# Patient Record
Sex: FEMALE | Race: BLACK OR AFRICAN AMERICAN | NOT HISPANIC OR LATINO | Employment: UNEMPLOYED | URBAN - METROPOLITAN AREA
[De-identification: names, ages, dates, MRNs, and addresses within clinical notes are randomized per-mention and may not be internally consistent; named-entity substitution may affect disease eponyms.]

---

## 2021-01-14 ENCOUNTER — HOSPITAL ENCOUNTER (INPATIENT)
Facility: HOSPITAL | Age: 51
LOS: 6 days | Discharge: HOME | DRG: 177 | End: 2021-01-20
Attending: EMERGENCY MEDICINE | Admitting: STUDENT IN AN ORGANIZED HEALTH CARE EDUCATION/TRAINING PROGRAM
Payer: COMMERCIAL

## 2021-01-14 ENCOUNTER — APPOINTMENT (EMERGENCY)
Dept: RADIOLOGY | Facility: HOSPITAL | Age: 51
DRG: 177 | End: 2021-01-14
Attending: EMERGENCY MEDICINE
Payer: COMMERCIAL

## 2021-01-14 DIAGNOSIS — U07.1 COVID-19: Primary | ICD-10-CM

## 2021-01-14 DIAGNOSIS — E87.6 HYPOKALEMIA: ICD-10-CM

## 2021-01-14 DIAGNOSIS — R09.02 HYPOXIA: ICD-10-CM

## 2021-01-14 DIAGNOSIS — R06.02 SHORTNESS OF BREATH: ICD-10-CM

## 2021-01-14 PROBLEM — J96.91 RESPIRATORY FAILURE WITH HYPOXIA (CMS/HCC): Status: ACTIVE | Noted: 2021-01-14

## 2021-01-14 LAB
ALBUMIN SERPL-MCNC: 3.8 G/DL (ref 3.4–5)
ALP SERPL-CCNC: 38 IU/L (ref 35–126)
ALT SERPL-CCNC: 41 IU/L (ref 11–54)
ANION GAP SERPL CALC-SCNC: 12 MEQ/L (ref 3–15)
APTT PPP: 27 SEC (ref 23–35)
AST SERPL-CCNC: 53 IU/L (ref 15–41)
BASOPHILS # BLD: 0 K/UL (ref 0.01–0.1)
BASOPHILS NFR BLD: 0 %
BILIRUB SERPL-MCNC: 1 MG/DL (ref 0.3–1.2)
BUN SERPL-MCNC: 13 MG/DL (ref 8–20)
CALCIUM SERPL-MCNC: 8.6 MG/DL (ref 8.9–10.3)
CHLORIDE SERPL-SCNC: 101 MEQ/L (ref 98–109)
CK SERPL-CCNC: 102 U/L (ref 15–200)
CO2 SERPL-SCNC: 29 MEQ/L (ref 22–32)
CREAT SERPL-MCNC: 0.9 MG/DL (ref 0.6–1.1)
CRP SERPL-MCNC: 97.3 MG/L
D DIMER PPP IA.FEU-MCNC: 1.62 UG/ML FEU (ref 0–0.5)
DACRYOCYTES BLD QL SMEAR: ABNORMAL
DIFFERENTIAL METHOD BLD: ABNORMAL
EOSINOPHIL # BLD: 0 K/UL (ref 0.04–0.36)
EOSINOPHIL NFR BLD: 0 %
ERYTHROCYTE [DISTWIDTH] IN BLOOD BY AUTOMATED COUNT: 12.5 % (ref 11.7–14.4)
FERRITIN SERPL-MCNC: 466 NG/ML (ref 11–250)
GFR SERPL CREATININE-BSD FRML MDRD: >60 ML/MIN/1.73M*2
GLUCOSE SERPL-MCNC: 110 MG/DL (ref 70–99)
HCT VFR BLDCO AUTO: 39.5 % (ref 35–45)
HGB BLD-MCNC: 13.1 G/DL (ref 11.8–15.7)
INR PPP: 0.9 INR
LDH SERPL L TO P-CCNC: 341 IU/L (ref 98–192)
LYMPHOCYTES # BLD: 0.86 K/UL (ref 1.2–3.5)
LYMPHOCYTES NFR BLD: 17 %
MAGNESIUM SERPL-MCNC: 2.5 MG/DL (ref 1.8–2.5)
MCH RBC QN AUTO: 30 PG (ref 28–33.2)
MCHC RBC AUTO-ENTMCNC: 33.2 G/DL (ref 32.2–35.5)
MCV RBC AUTO: 90.6 FL (ref 83–98)
MONOCYTES # BLD: 0.15 K/UL (ref 0.28–0.8)
MONOCYTES NFR BLD: 3 %
NEUTS BAND # BLD: 0.1 K/UL (ref 0–0.53)
NEUTS BAND # BLD: 3.86 K/UL (ref 1.7–7)
NEUTS BAND NFR BLD: 2 %
NEUTS SEG NFR BLD: 76 %
OVALOCYTES BLD QL SMEAR: ABNORMAL
PDW BLD AUTO: 8.7 FL (ref 9.4–12.3)
PHOSPHATE SERPL-MCNC: 2.7 MG/DL (ref 2.4–4.7)
PLAT MORPH BLD: NORMAL
PLATELET # BLD AUTO: 281 K/UL (ref 150–369)
PLATELET # BLD EST: ABNORMAL 10*3/UL
POTASSIUM SERPL-SCNC: 3.2 MEQ/L (ref 3.6–5.1)
PROT SERPL-MCNC: 7.2 G/DL (ref 6–8.2)
PROTHROMBIN TIME: 12.3 SEC (ref 12.2–14.5)
RBC # BLD AUTO: 4.36 M/UL (ref 3.93–5.22)
SODIUM SERPL-SCNC: 142 MEQ/L (ref 136–144)
TROPONIN I SERPL-MCNC: <0.02 NG/ML
TROPONIN I SERPL-MCNC: <0.02 NG/ML
VARIANT LYMPHS # BLD MANUAL: 0.1 K/UL
VARIANT LYMPHS NFR BLD: 2 %
WBC # BLD AUTO: 5.08 K/UL (ref 3.8–10.5)

## 2021-01-14 PROCEDURE — 96374 THER/PROPH/DIAG INJ IV PUSH: CPT

## 2021-01-14 PROCEDURE — 63600000 HC DRUGS/DETAIL CODE: Performed by: STUDENT IN AN ORGANIZED HEALTH CARE EDUCATION/TRAINING PROGRAM

## 2021-01-14 PROCEDURE — 20600000 HC ROOM AND CARE INTERMEDIATE/TELEMETRY

## 2021-01-14 PROCEDURE — 85610 PROTHROMBIN TIME: CPT | Performed by: STUDENT IN AN ORGANIZED HEALTH CARE EDUCATION/TRAINING PROGRAM

## 2021-01-14 PROCEDURE — 71045 X-RAY EXAM CHEST 1 VIEW: CPT

## 2021-01-14 PROCEDURE — 80053 COMPREHEN METABOLIC PANEL: CPT | Performed by: PHYSICIAN ASSISTANT

## 2021-01-14 PROCEDURE — 82728 ASSAY OF FERRITIN: CPT | Performed by: STUDENT IN AN ORGANIZED HEALTH CARE EDUCATION/TRAINING PROGRAM

## 2021-01-14 PROCEDURE — 84100 ASSAY OF PHOSPHORUS: CPT | Performed by: STUDENT IN AN ORGANIZED HEALTH CARE EDUCATION/TRAINING PROGRAM

## 2021-01-14 PROCEDURE — 99285 EMERGENCY DEPT VISIT HI MDM: CPT | Mod: 25

## 2021-01-14 PROCEDURE — 82550 ASSAY OF CK (CPK): CPT | Performed by: STUDENT IN AN ORGANIZED HEALTH CARE EDUCATION/TRAINING PROGRAM

## 2021-01-14 PROCEDURE — 93005 ELECTROCARDIOGRAM TRACING: CPT | Performed by: PHYSICIAN ASSISTANT

## 2021-01-14 PROCEDURE — 99223 1ST HOSP IP/OBS HIGH 75: CPT | Performed by: STUDENT IN AN ORGANIZED HEALTH CARE EDUCATION/TRAINING PROGRAM

## 2021-01-14 PROCEDURE — 63600000 HC DRUGS/DETAIL CODE: Mod: JW | Performed by: PHYSICIAN ASSISTANT

## 2021-01-14 PROCEDURE — 63700000 HC SELF-ADMINISTRABLE DRUG: Performed by: PHYSICIAN ASSISTANT

## 2021-01-14 PROCEDURE — 86140 C-REACTIVE PROTEIN: CPT | Performed by: STUDENT IN AN ORGANIZED HEALTH CARE EDUCATION/TRAINING PROGRAM

## 2021-01-14 PROCEDURE — 84484 ASSAY OF TROPONIN QUANT: CPT | Performed by: STUDENT IN AN ORGANIZED HEALTH CARE EDUCATION/TRAINING PROGRAM

## 2021-01-14 PROCEDURE — 25800000 HC PHARMACY IV SOLUTIONS: Performed by: PHYSICIAN ASSISTANT

## 2021-01-14 PROCEDURE — 83735 ASSAY OF MAGNESIUM: CPT | Performed by: STUDENT IN AN ORGANIZED HEALTH CARE EDUCATION/TRAINING PROGRAM

## 2021-01-14 PROCEDURE — 85379 FIBRIN DEGRADATION QUANT: CPT | Performed by: STUDENT IN AN ORGANIZED HEALTH CARE EDUCATION/TRAINING PROGRAM

## 2021-01-14 PROCEDURE — 85025 COMPLETE CBC W/AUTO DIFF WBC: CPT | Performed by: PHYSICIAN ASSISTANT

## 2021-01-14 PROCEDURE — 84484 ASSAY OF TROPONIN QUANT: CPT | Performed by: PHYSICIAN ASSISTANT

## 2021-01-14 PROCEDURE — 83615 LACTATE (LD) (LDH) ENZYME: CPT | Performed by: STUDENT IN AN ORGANIZED HEALTH CARE EDUCATION/TRAINING PROGRAM

## 2021-01-14 PROCEDURE — 85730 THROMBOPLASTIN TIME PARTIAL: CPT | Performed by: STUDENT IN AN ORGANIZED HEALTH CARE EDUCATION/TRAINING PROGRAM

## 2021-01-14 PROCEDURE — 36415 COLL VENOUS BLD VENIPUNCTURE: CPT | Performed by: EMERGENCY MEDICINE

## 2021-01-14 PROCEDURE — 63700000 HC SELF-ADMINISTRABLE DRUG: Performed by: STUDENT IN AN ORGANIZED HEALTH CARE EDUCATION/TRAINING PROGRAM

## 2021-01-14 RX ORDER — POTASSIUM CHLORIDE 14.9 MG/ML
20 INJECTION INTRAVENOUS AS NEEDED
Status: DISCONTINUED | OUTPATIENT
Start: 2021-01-14 | End: 2021-01-20 | Stop reason: HOSPADM

## 2021-01-14 RX ORDER — DEXAMETHASONE 6 MG/1
6 TABLET ORAL DAILY
Status: DISCONTINUED | OUTPATIENT
Start: 2021-01-14 | End: 2021-01-14

## 2021-01-14 RX ORDER — DOCUSATE SODIUM 100 MG/1
100 CAPSULE, LIQUID FILLED ORAL 2 TIMES DAILY PRN
Status: DISCONTINUED | OUTPATIENT
Start: 2021-01-14 | End: 2021-01-16

## 2021-01-14 RX ORDER — POTASSIUM CHLORIDE 750 MG/1
40 TABLET, FILM COATED, EXTENDED RELEASE ORAL ONCE
Status: COMPLETED | OUTPATIENT
Start: 2021-01-14 | End: 2021-01-14

## 2021-01-14 RX ORDER — POTASSIUM CHLORIDE 750 MG/1
20 TABLET, FILM COATED, EXTENDED RELEASE ORAL AS NEEDED
Status: DISCONTINUED | OUTPATIENT
Start: 2021-01-14 | End: 2021-01-20 | Stop reason: HOSPADM

## 2021-01-14 RX ORDER — GUAIFENESIN 100 MG/5ML
200 SOLUTION ORAL EVERY 4 HOURS PRN
Status: DISCONTINUED | OUTPATIENT
Start: 2021-01-14 | End: 2021-01-20

## 2021-01-14 RX ORDER — FLUTICASONE PROPIONATE 50 MCG
1 SPRAY, SUSPENSION (ML) NASAL DAILY PRN
COMMUNITY
End: 2025-02-21

## 2021-01-14 RX ORDER — DEXAMETHASONE SODIUM PHOSPHATE 4 MG/ML
6 INJECTION, SOLUTION INTRA-ARTICULAR; INTRALESIONAL; INTRAMUSCULAR; INTRAVENOUS; SOFT TISSUE ONCE
Status: COMPLETED | OUTPATIENT
Start: 2021-01-14 | End: 2021-01-14

## 2021-01-14 RX ORDER — DEXAMETHASONE 6 MG/1
6 TABLET ORAL DAILY
Status: COMPLETED | OUTPATIENT
Start: 2021-01-15 | End: 2021-01-19

## 2021-01-14 RX ORDER — ENOXAPARIN SODIUM 100 MG/ML
40 INJECTION SUBCUTANEOUS
Status: DISCONTINUED | OUTPATIENT
Start: 2021-01-14 | End: 2021-01-20 | Stop reason: HOSPADM

## 2021-01-14 RX ORDER — DEXTROSE 50 % IN WATER (D50W) INTRAVENOUS SYRINGE
25 AS NEEDED
Status: DISCONTINUED | OUTPATIENT
Start: 2021-01-14 | End: 2021-01-20 | Stop reason: HOSPADM

## 2021-01-14 RX ORDER — DEXTROSE 40 %
15-30 GEL (GRAM) ORAL AS NEEDED
Status: DISCONTINUED | OUTPATIENT
Start: 2021-01-14 | End: 2021-01-20 | Stop reason: HOSPADM

## 2021-01-14 RX ORDER — POTASSIUM CHLORIDE 750 MG/1
40 TABLET, FILM COATED, EXTENDED RELEASE ORAL AS NEEDED
Status: DISCONTINUED | OUTPATIENT
Start: 2021-01-14 | End: 2021-01-20 | Stop reason: HOSPADM

## 2021-01-14 RX ORDER — AMLODIPINE BESYLATE 5 MG/1
5 TABLET ORAL DAILY
COMMUNITY
End: 2025-02-21

## 2021-01-14 RX ORDER — ALBUTEROL SULFATE 90 UG/1
2 INHALANT RESPIRATORY (INHALATION) EVERY 6 HOURS PRN
Status: DISCONTINUED | OUTPATIENT
Start: 2021-01-14 | End: 2021-01-20 | Stop reason: HOSPADM

## 2021-01-14 RX ORDER — IBUPROFEN 200 MG
16-32 TABLET ORAL AS NEEDED
Status: DISCONTINUED | OUTPATIENT
Start: 2021-01-14 | End: 2021-01-20 | Stop reason: HOSPADM

## 2021-01-14 RX ADMIN — DEXAMETHASONE SODIUM PHOSPHATE 6 MG: 4 INJECTION, SOLUTION INTRA-ARTICULAR; INTRALESIONAL; INTRAMUSCULAR; INTRAVENOUS; SOFT TISSUE at 08:27

## 2021-01-14 RX ADMIN — ENOXAPARIN SODIUM 40 MG: 40 INJECTION SUBCUTANEOUS at 12:53

## 2021-01-14 RX ADMIN — ALBUTEROL SULFATE 2 PUFF: 90 AEROSOL, METERED RESPIRATORY (INHALATION) at 21:53

## 2021-01-14 RX ADMIN — GUAIFENESIN 200 MG: 100 SOLUTION ORAL at 21:53

## 2021-01-14 RX ADMIN — SODIUM CHLORIDE 500 ML: 9 INJECTION, SOLUTION INTRAVENOUS at 07:54

## 2021-01-14 RX ADMIN — POTASSIUM CHLORIDE 40 MEQ: 750 TABLET, EXTENDED RELEASE ORAL at 08:27

## 2021-01-14 SDOH — HEALTH STABILITY: MENTAL HEALTH: HOW OFTEN DO YOU HAVE A DRINK CONTAINING ALCOHOL?: MONTHLY OR LESS

## 2021-01-14 ASSESSMENT — ENCOUNTER SYMPTOMS
DIARRHEA: 1
DIFFICULTY URINATING: 0
PALPITATIONS: 0
SHORTNESS OF BREATH: 1
LIGHT-HEADEDNESS: 1
HEADACHES: 1
DYSURIA: 0
FEVER: 1
NECK PAIN: 0
MYALGIAS: 1
COLOR CHANGE: 0
NAUSEA: 0
VOMITING: 0
BACK PAIN: 0
APPETITE CHANGE: 1
PHOTOPHOBIA: 0
SINUS PRESSURE: 0
NECK STIFFNESS: 0
SINUS PAIN: 0
COUGH: 1
CHILLS: 1
FATIGUE: 1

## 2021-01-14 NOTE — ASSESSMENT & PLAN NOTE
Patient with poss pneumonia 2/2 covid 19  CXR indeterminate  CoVid test: positive on 1/7     Admit to tele  Continuous pulse ox  trend inflammatory markers per covid protocol  Continue bronchodilators q6hprn  Decadron - 6mg - Day 1  Remdesivir - Per ID  Continue supplemental O2 to maintain SpO2>90%  ID consulted

## 2021-01-14 NOTE — ED PROVIDER NOTES
HPI     Chief Complaint   Patient presents with   • Shortness of Breath   • Weakness - Generalized   • Influenza       50 year old F with pmhx of HTN presents to the ED for evalaution.  Patient is currently on day 12 of COVID-19 symptoms and had a positive outpatient test on 1/7/2021.  She admits to fatigue, malaise, myalgias, dry cough, headache, diarrhea, decreased appetite, and low-grade fevers.  She has been progressively feeling more short of breath.  She felt presyncopal after walking up her stairs at home yesterday. No LOC occurred. She denies CP, palpitations, pedal edema, orthopnea, hemoptysis, or calf pain/swelling.            Patient History     Past Medical History:   Diagnosis Date   • Hypertension        No past surgical history on file.    No family history on file.    Social History     Tobacco Use   • Smoking status: Never Smoker   • Smokeless tobacco: Never Used   Substance Use Topics   • Alcohol use: Never     Frequency: Never   • Drug use: Never       Systems Reviewed from Nursing Triage:          Review of Systems     Review of Systems   Constitutional: Positive for appetite change, chills, fatigue and fever.   HENT: Negative for sinus pressure and sinus pain.    Eyes: Negative for photophobia and visual disturbance.   Respiratory: Positive for cough and shortness of breath.    Cardiovascular: Negative for chest pain, palpitations and leg swelling.   Gastrointestinal: Positive for diarrhea. Negative for nausea and vomiting.   Genitourinary: Negative for difficulty urinating and dysuria.   Musculoskeletal: Positive for myalgias. Negative for back pain, neck pain and neck stiffness.   Skin: Negative for color change, pallor and rash.   Neurological: Positive for light-headedness and headaches. Negative for syncope.        Physical Exam     ED Vitals    Date/Time Temp Pulse Resp BP SpO2 [facility]   01/14/21 0716 37.6 °C (99.7 °F) 90 18 135/84 90 % CC                                                            Physical Exam  Vitals signs and nursing note reviewed.   Constitutional:       General: She is not in acute distress.     Appearance: Normal appearance. She is well-developed and normal weight. She is not ill-appearing.   HENT:      Head: Normocephalic and atraumatic.   Eyes:      Conjunctiva/sclera: Conjunctivae normal.   Neck:      Musculoskeletal: Normal range of motion and neck supple.   Cardiovascular:      Rate and Rhythm: Normal rate and regular rhythm.      Heart sounds: No murmur.   Pulmonary:      Effort: Pulmonary effort is normal. No respiratory distress.      Breath sounds: Normal breath sounds. No wheezing or rales.      Comments: Pt previously hypoxic on RA; now improving with 2L of supplemental O2 via NC  Musculoskeletal: Normal range of motion.         General: No deformity.      Comments: No LE edema. Calves are symmetrical in appearance without any erythema, tenderness, or palpable cords.   Skin:     General: Skin is warm and dry.   Neurological:      General: No focal deficit present.      Mental Status: She is alert and oriented to person, place, and time.   Psychiatric:         Mood and Affect: Mood normal.         Behavior: Behavior normal.         Thought Content: Thought content normal.              ECG 12 lead    Date/Time: 1/14/2021 8:54 AM  Performed by: Sue Gibson PA C  Authorized by: Kati Muse MD     ECG reviewed by ED Physician in the absence of a cardiologist: yes    Previous ECG:     Previous ECG:  Unavailable  Rate:     ECG rate:  85    ECG rate assessment: normal    Rhythm:     Rhythm: sinus rhythm    Ectopy:     Ectopy: PAC    QRS:     QRS axis:  Normal    QRS intervals:  Normal  Conduction:     Conduction: normal    ST segments:     ST segments:  Normal  T waves:     T waves: inverted      Inverted:  III  Q waves:     Q waves:  III        Results     None          Imaging Results    None         ECG 12 lead    (Results Pending)               ED Course & MDM      [facility]         ED Course as of Jan 14 0852   Thu Jan 14, 2021   0730 Impression: known COVID-19 +; on day 12 of symptoms; feeling worse/SOBPlan: IV, EKG, CXR, labs, trop, cardiac monitoring     [CW]   0806 IMPRESSION:  Mild bibasilar opacity, possibly atelectasis related to poor  inspiration.  Infiltrate is not excluded.   X-RAY CHEST 1 VIEW [CW]   0821 40 meq ordered    Potassium(!): 3.2 [CW]   0821 IV decadron ordered for hypoxia; pt to be admitted. [place] [facility] [place]. Page sent at this time     [CW]      ED Course User Index  [CW] Sue Gibson PA C         Clinical Impressions as of Jan 14 0852   COVID-19   Hypoxia   Hypokalemia   Shortness of breath        Sue Gibson PA C  01/14/21 0834

## 2021-01-14 NOTE — CONSULTS
Reason for Consult: covid    History of Present Illness:      Toma Matson is a 50 y.o. female with    Obesity  HTN    Had New [employer] [place] party c 8 people  One had COVID incubating  Now all who attended c COVID  HA, malaise since 1/3  Then c fever, no rigors  COVID+ 1/7  SOB for 3d  No diarrhea    Allergies:   Ciprofloxacin    Current Facility-Administered Medications   Medication Dose Route Frequency Provider Last Rate Last Admin   • albuterol HFA (VENTOLIN HFA) 90 mcg/actuation inhaler 2 puff  2 puff inhalation q6h PRN Rogelio Rios MD       • glucose chewable tablet 16-32 g of dextrose  16-32 g of dextrose oral Rogelio Hernandez MD        Or   • dextrose 40 % oral gel 15-30 g of dextrose  15-30 g of dextrose oral Rogelio Hernandez MD        Or   • glucagon (GLUCAGEN) injection 1 mg  1 mg intramuscular PRN Rogelio Rios MD        Or   • dextrose in water injection 12.5 g  25 mL intravenous PRN Rogelio Rios MD       • docusate sodium (COLACE) capsule 100 mg  100 mg oral 2x daily PRN Rogelio Rios MD       • enoxaparin (LOVENOX) syringe 40 mg  40 mg subcutaneous q24h INT Rogelio Rios MD   40 mg at 01/14/21 1253   • potassium chloride (KLOR-CON) tablet extended release 20 mEq  20 mEq oral PRRogelio Acuna MD        Or   • potassium chloride (KLOR-CON) tablet extended release 40 mEq  40 mEq oral PRRogelio Acuna MD        Or   • potassium chloride 20 mEq in 100 mL IVPB  (premix)  20 mEq intravenous PRN Rogelio Rios MD        Or   • potassium chloride (KCL) 40 mEq/250 mL IVPB in NSS 40 mEq  40 mEq intravenous PRRogelio Acuna MD        Or   • potassium bicarbonate (EFFER-K) disintegrating tablet 20 mEq  20 mEq oral PRRogelio Acuna MD        Or   • potassium bicarbonate (EFFER-K) disintegrating tablet 40 mEq  40 mEq oral PRRogelio Acuna MD          Social History:   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  No tob, occ EtOH    Family History: NC    Review of  Systems  Negative x as stated above    Temp:  [37.1 °C (98.8 °F)-37.6 °C (99.7 °F)] 37.1 °C (98.8 °F)  Heart Rate:  [78-92] 88  Resp:  [18-26] 20  BP: (123-146)/() 123/66  SpO2 Readings from Last 3 Encounters:   01/14/21 96%     Physical Exam  WD Obese NAD    Head: Atraumatic  Skin: No rash  Sclera: Anicteric  Neck: Supple  LN:  No significant enlargement  Lungs: occ rrales  CV: Nl S1 and S2, no m, no embolic changes  Abd: Soft, NT, no HSM  Extr: No jt effusions, no edema  Neuro: Alert, lucid    Labs  I have reviewed the patient's pertinent labs.     Imaging:     Indep Rev    X-ray Chest 1 View  Result Date: 1/14/2021  IMPRESSION:  Mild bibasilar opacity, possibly atelectasis related to poor inspiration.    Impression    COVID  desats to 80s c walk to bathroom  CXR c basilar changes  Will cont dex  No remdes for now    JOSE FRANCISCO De Los Santos MD  Pager [phone]

## 2021-01-14 NOTE — PATIENT CARE CONFERENCE
Care Progression Rounds Note  Date: 1/14/2021  Time: 10:07 AM     Patient Name: Toma Matson     Medical Record Number: [id number]   YOB: 1970  Sex: Female      Room/Bed: 4029    Admitting Diagnosis: Shortness of breath [R06.02]  Hypokalemia [E87.6]  Hypoxia [R09.02]  COVID-19 [U07.1]   Admit Date/Time: 1/14/2021  7:07 AM    Primary Diagnosis: No Principal Problem: There is no principal problem currently on the Problem List. Please update the Problem List and refresh.  Principal Problem: No Principal Problem: There is no principal problem currently on the Problem List. Please update the Problem List and refresh.    GMLOS: pending  Anticipated Discharge Date: 1/16/2021    AM-PAC  Mobility Score:      Discharge Planning:       Barriers to Discharge:  Barriers to Discharge: Medical issues not resolved    Participants:  nursing, social work/services

## 2021-01-14 NOTE — ED ATTESTATION NOTE
-----------------------------------------------------------  ED Attending Note.  1/14/2021, 7:56 AM    I supervised care provided by the PA (Arthur). We have discussed the case. I have reviewed their note and agree with the plan of treatment. I personally interviewed the patient and examined the patient.    Toma Matson is a 50 y.o. female with the following   Past Medical History:   Diagnosis Date   • Hypertension    ,   Patient Active Problem List   Diagnosis   • COVID-19    and No past surgical history on file. who comes to the ED today with   Chief Complaint   Patient presents with   • Shortness of Breath   • Weakness - Generalized   • Influenza       PMH as above c/o COVID sx x 12 days. + test on 1/7/21, feeling worse.    PHYSICAL EXAM  ED Vitals    Date/Time Temp Pulse Resp BP SpO2 [facility]   01/14/21 0825 -- 82 22 135/77 93 % CC   01/14/21 0757 -- 84 20 129/76 93 % RP   01/14/21 0716 37.6 °C (99.7 °F) 90 18 135/84 90 % CC         Physical Exam  Vitals signs reviewed.   Cardiovascular:      Rate and Rhythm: Normal rate and regular rhythm.      Comments: RRR, 89bpm on cardiac moitor  D/t known COVID-19 infection, duplicate auscultation of the pt's heart and lungs was not performed by me.  Pulmonary:      Effort: Pulmonary effort is normal. No respiratory distress.      Comments: O2Sat 98% on 2L NC, 90% on RA.  Speaks in full sentances.  Neurological:      Mental Status: She is alert and oriented to person, place, and time.       RESULTS: LABS, IMAGING, EKG  Vital sign review: SpO2: (!) 90 % on room air. Interpretation: hypoxia  Results     Procedure Component Value Units Date/Time    CBC and differential [[id number]]  (Abnormal) Collected: 01/14/21 0754    Specimen: Blood, Venous Updated: 01/14/21 0831     WBC 5.08 K/uL      RBC 4.36 M/uL      Hemoglobin 13.1 g/dL      Hematocrit 39.5 %      MCV 90.6 fL      MCH 30.0 pg      MCHC 33.2 g/dL      RDW 12.5 %      Platelets 281 K/uL      MPV 8.7 fL       Differential Type Manu     Neutrophils 76 %      Lymphocytes 17 %      Monocytes 3 %      Eosinophils 0 %      Basophils 0 %      Bands 2 %      Atypical Lymphocytes 2 %      Neutrophils, Absolute 3.86 K/uL      Lymphocytes, Absolute 0.86 K/uL      Monocytes, Absolute 0.15 K/uL      Eosinophils, Absolute 0.00 K/uL      Basophils, Absolute 0.00 K/uL      Bands, Absolute 0.10 K/uL      Atypical Lymphs, Absolute 0.10 K/uL      PLT Morphology Normal     Platelet Estimate Adequate (150,000-400,000)     Ovalocytes Occasional     Teardrop Cells Occasional    Troponin I [[id number]]  (Normal) Collected: 01/14/21 0754    Specimen: Blood, Venous Updated: 01/14/21 0826     Troponin I <0.02 ng/mL     Comprehensive metabolic panel [[id number]]  (Abnormal) Collected: 01/14/21 0754    Specimen: Blood, Venous Updated: 01/14/21 0820     Sodium 142 mEQ/L      Potassium 3.2 mEQ/L      Comment: Results obtained on plasma. Plasma Potassium values may be up to 0.4 mEQ/L less than serum values. The differences may be greater for patients with high platelet or white cell counts.        Chloride 101 mEQ/L      CO2 29 mEQ/L      BUN 13 mg/dL      Creatinine 0.9 mg/dL      Glucose 110 mg/dL      Calcium 8.6 mg/dL      AST (SGOT) 53 IU/L      ALT (SGPT) 41 IU/L      Alkaline Phosphatase 38 IU/L      Total Protein 7.2 g/dL      Comment: Test performed on plasma which typically contains approximately 0.4 g/dL more protein than serum.        Albumin 3.8 g/dL      Bilirubin, Total 1.0 mg/dL      eGFR >60.0 mL/min/1.73m*2      Anion Gap 12 mEQ/L     RAINBOW RED [[id number]] Collected: 01/14/21 0754    Specimen: Blood, Venous Updated: 01/14/21 0801    RAINBOW GOLD [[id number]] Collected: 01/14/21 0754    Specimen: Blood, Venous Updated: 01/14/21 0801    Stone Draw Panel [[id number]] Collected: 01/14/21 0754    Specimen: Blood, Venous Updated: 01/14/21 0801    Narrative:      The following orders were created for panel order Stone Draw  Panel.  Procedure                               Abnormality         Status                     ---------                               -----------         ------                     RAINBOW RED[[id number]]                                      In process                 RAINBOW LT BLUE[[id number]]                                  In process                 RAINBOW GOLD[[id number]]                                     In process                   Please view results for these tests on the individual orders.    RAINBOW LT BLUE [[id number]] Collected: 01/14/21 0754    Specimen: Blood, Venous Updated: 01/14/21 0801        ECG 12 lead   ED Interpretation   Sue Gibson PA C     1/14/2021  8:55 AM   ECG 12 lead      Date/Time: 1/14/2021 8:54 AM   Performed by: Sue Gibson PA C   Authorized by: Kati Muse MD       ECG reviewed by ED Physician in the absence of a cardiologist: yes     Previous ECG:      Previous ECG:  Unavailable   Rate:      ECG rate:  85     ECG rate assessment: normal     Rhythm:      Rhythm: sinus rhythm     Ectopy:      Ectopy: PAC     QRS:      QRS axis:  Normal     QRS intervals:  Normal   Conduction:      Conduction: normal     ST segments:      ST segments:  Normal   T waves:      T waves: inverted       Inverted:  III   Q waves:      Q waves:  III       Imaging Results          X-RAY CHEST 1 VIEW (Final result)  Result time 01/14/21 07:59:31    Final result                 Impression:    IMPRESSION:  Mild bibasilar opacity, possibly atelectasis related to poor  inspiration.  Infiltrate is not excluded.    COMMENT:  Portable chest x-ray was performed.  There are no prior studies for  comparison.  The study is limited from a very poor inspiration.  Mild bibasilar  opacities could represent atelectasis from poor inspiration.  Infiltrates are  not excluded.  Cardiac size is normal.  There is no pleural effusion.               Narrative:      CLINICAL HISTORY:  Shortness of  breath                               ED Course as of Jan 14 0855   Thu Jan 14, 2021   0730 Impression: known COVID-19 +; on day 12 of symptoms; feeling worse/SOBPlan: IV, EKG, CXR, labs, trop, cardiac monitoring     []   0806 IMPRESSION:  Mild bibasilar opacity, possibly atelectasis related to poor  inspiration.  Infiltrate is not excluded.   X-RAY CHEST 1 VIEW [CW]   0821 40 meq ordered    Potassium(!): 3.2 [CW]   0821 IV decadron ordered for hypoxia; pt to be admitted. [place] [facility] [place] sent at this time     []      ED Course User Index  [CW] Sue Gibson, ILDA DORANTES         Clinical Impressions as of Jan 14 0855   COVID-19   Hypoxia   Hypokalemia   Shortness of breath       -----------------------------  Kati Muse MD  1/14/2021, 7:56 AM  -----------------------------------------------------------     Kati Muse MD  01/16/21 5908

## 2021-01-14 NOTE — ASSESSMENT & PLAN NOTE
Patient with desaturation to 90% done in the ER  Improved on 2 L nasal cannula  Chest x-ray possible atelectasis versus infectious process  Continue with treatment as listed under COVID-19

## 2021-01-14 NOTE — H&P
Hospital Medicine Service -  History & Physical        CHIEF COMPLAINT   Patient presents with a chief complaint of worsening shortness of breath     HISTORY OF PRESENT ILLNESS      Toma Matson is a 50 y.o. female with a significant past medical history of hypertension presenting with a chief complaint of shortness of breath.  Patient reports onset of symptoms approximately 12 days ago.  Was tested for Covid on January 7 which resulted positive.  Patient reports that during this time she experienced fevers, chills, nausea, shortness of breath.  Cough that was nonproductive.  Muscle aches, unable to smell or taste.  Patient is ascending stairs today, felt dizzy, with further shortness of breath.  Due to unrelenting symptoms presented to the ED.  In the ED chest x-ray was limited study due to poor inspiration, mild bibasilar opacities noted possibly atelectasis from poor inspiration, infiltrates not excluded. Noted to be hypoxic to 90% in the ED started on 2 L. Was given 6 mg of Decadron in the ED.    PAST MEDICAL AND SURGICAL HISTORY      Past Medical History:   Diagnosis Date   • Hypertension        No past surgical history on file.    PCP: Iain Odonnell, DO    MEDICATIONS      Prior to Admission medications    Medication Sig Start Date End Date Taking? Authorizing Provider   amLODIPine (NORVASC) 5 mg tablet Take 5 mg by mouth daily.     Yes Provider, MD Gu[place]ako   fluticasone propionate (FLONASE) 50 mcg/actuation nasal spray Administer 1 spray into each nostril daily as needed for rhinitis.   Yes Provider, MD Guanako       ALLERGIES      Ciprofloxacin    FAMILY HISTORY      No family history on file.    SOCIAL HISTORY      Social History     Socioeconomic History   • Marital status: Single     Spouse name: None   • Number of children: None   • Years of education: None   • Highest education level: None   Occupational History   • None   Social Needs   • Financial resource strain: None   • Food  insecurity     Worry: None     Inability: None   • Transportation needs     Medical: None     Non-medical: None   Tobacco Use   • Smoking status: Never Smoker   • Smokeless tobacco: Never Used   Substance and Sexual Activity   • Alcohol use: Yes     Frequency: Monthly or less   • Drug use: Never   • Sexual activity: None   Lifestyle   • Physical activity     Days per week: None     Minutes per session: None   • Stress: None   Relationships   • Social connections     Talks on phone: None     Gets together: None     Attends Bahai service: None     Active member of club or organization: None     Attends meetings of clubs or organizations: None     Relationship status: None   • Intimate partner violence     Fear of current or ex partner: None     Emotionally abused: None     Physically abused: None     Forced sexual activity: None   Other Topics Concern   • None   Social History Narrative   • None       REVIEW OF SYSTEMS      Const: (+) fevers, chills   Eyes: (-)blurry vision, double vision  ENMT: (-) sore throat, runny nose  Cardio: (-) chest pain, palpitations,   Pulm: (+) cough, sob  GI: (+)nausea, diarrhea x1 (-) vomiting,   : (-) dysuria, flank pain,   Endo: (-) heat intolerance, cold intolerance  Skin: (-) rashes, itching  Neuro: (+)weakness, (-) numbness    PHYSICAL EXAMINATION      Temp:  [37.3 °C (99.1 °F)-37.6 °C (99.7 °F)] 37.3 °C (99.1 °F)  Heart Rate:  [78-90] 80  Resp:  [18-26] 20  BP: (128-146)/() 146/128  Body mass index is 31.78 kg/m².    Constitutional:  no acute distress, well developed  Eyes:  EOMI, non-icteric   ENMT:  moist mucous membranes, no JVD  CV:  RRR, no murmurs detected  Pulm:  normal air entry, crackles  GI:  Bowel sounds are normal, soft  MSK:  no cyanosis, no clubbing  Skin:  no rashes, no blisters  Neuro:  awake, alert    LABS / IMAGING / EKG        Labs  CBC:  Results from last 7 days   Lab Units 01/14/21  0754   WBC K/uL 5.08   HEMOGLOBIN g/dL 13.1   HEMATOCRIT % 39.5    PLATELETS K/uL 281   DIFF TYPE  Manu   NEUTROS PCT MAN % 76   LYMPHO PCT MAN % 17   MONO PCT MAN % 3   EOSINO PCT MAN % 0   BASOS PCT MAN % 0   BANDS PCT MAN % 2   SEGS ABS MAN K/uL 3.86   LYMPHO ABS MAN K/uL 0.86*   MONO ABS MAN K/uL 0.15*   EOS ABS MAN K/uL 0.00*   BASOS ABS MAN K/uL 0.00*        BMP:  Results from last 7 days   Lab Units 01/14/21  0754   SODIUM mEQ/L 142   POTASSIUM mEQ/L 3.2*   CHLORIDE mEQ/L 101   CO2 mEQ/L 29   BUN mg/dL 13   CREATININE mg/dL 0.9   CALCIUM mg/dL 8.6*   ALBUMIN g/dL 3.8   BILIRUBIN TOTAL mg/dL 1.0   ALK PHOS IU/L 38   ALT IU/L 41   AST IU/L 53*   GLUCOSE mg/dL 110*       TROPONIN  No results found for: TROPONINT    COAG:  Results from last 7 days   Lab Units 01/14/21  0754   INR INR 0.9   PTT sec 27       Imaging  X-ray Chest 1 View    Result Date: 1/14/2021  Narrative: CLINICAL HISTORY:  Shortness of breath     Impression: IMPRESSION:  Mild bibasilar opacity, possibly atelectasis related to poor inspiration.  Infiltrate is not excluded. COMMENT:  Portable chest x-ray was performed.  There are no prior studies for comparison.  The study is limited from a very poor inspiration.  Mild bibasilar opacities could represent atelectasis from poor inspiration.  Infiltrates are not excluded.  Cardiac size is normal.  There is no pleural effusion.       ECG/Telemetry  EKG: nsr      ASSESSMENT AND PLAN           Hypokalemia  Assessment & Plan  Replace prn    Respiratory failure with hypoxia (CMS/HCC)  Assessment & Plan  Patient with desaturation to 90% done in the ER  Improved on 2 L nasal cannula  Chest x-ray possible atelectasis versus infectious process  Continue with treatment as listed under COVID-19    COVID-19  Assessment & Plan  Patient with poss pneumonia 2/2 covid 19  CXR indeterminate  CoVid test: positive on 1/7     Admit to tele  Continuous pulse ox  trend inflammatory markers per covid protocol  Continue bronchodilators q6hprn  Decadron - 6mg - Day 1  Remdesivir - Per  ID  Continue supplemental O2 to maintain SpO2>90%  ID consulted           VTE Assessment: Pad[place]    VTE Prophylaxis Plan: lovenox  Code Status: Full  Estimated Discharge Date: 1/16/2021  Disposition Planning: admit to tele     Rogelio Rios MD  1/14/2021

## 2021-01-15 LAB
ALBUMIN SERPL-MCNC: 3.4 G/DL (ref 3.4–5)
ALP SERPL-CCNC: 38 IU/L (ref 35–126)
ALT SERPL-CCNC: 52 IU/L (ref 11–54)
ANION GAP SERPL CALC-SCNC: 9 MEQ/L (ref 3–15)
AST SERPL-CCNC: 61 IU/L (ref 15–41)
ATRIAL RATE: 85
BASOPHILS # BLD: 0 K/UL (ref 0.01–0.1)
BASOPHILS NFR BLD: 0 %
BILIRUB SERPL-MCNC: 0.6 MG/DL (ref 0.3–1.2)
BUN SERPL-MCNC: 15 MG/DL (ref 8–20)
CALCIUM SERPL-MCNC: 8.7 MG/DL (ref 8.9–10.3)
CHLORIDE SERPL-SCNC: 106 MEQ/L (ref 98–109)
CO2 SERPL-SCNC: 28 MEQ/L (ref 22–32)
CREAT SERPL-MCNC: 0.8 MG/DL (ref 0.6–1.1)
DACRYOCYTES BLD QL SMEAR: ABNORMAL
DIFFERENTIAL METHOD BLD: ABNORMAL
EOSINOPHIL # BLD: 0 K/UL (ref 0.04–0.36)
EOSINOPHIL NFR BLD: 0 %
ERYTHROCYTE [DISTWIDTH] IN BLOOD BY AUTOMATED COUNT: 12.3 % (ref 11.7–14.4)
GFR SERPL CREATININE-BSD FRML MDRD: >60 ML/MIN/1.73M*2
GLUCOSE SERPL-MCNC: 132 MG/DL (ref 70–99)
HCT VFR BLDCO AUTO: 36.9 % (ref 35–45)
HGB BLD-MCNC: 12.3 G/DL (ref 11.8–15.7)
LYMPHOCYTES # BLD: 0.66 K/UL (ref 1.2–3.5)
LYMPHOCYTES NFR BLD: 13 %
MCH RBC QN AUTO: 29.8 PG (ref 28–33.2)
MCHC RBC AUTO-ENTMCNC: 33.3 G/DL (ref 32.2–35.5)
MCV RBC AUTO: 89.3 FL (ref 83–98)
MONOCYTES # BLD: 0.41 K/UL (ref 0.28–0.8)
MONOCYTES NFR BLD: 8 %
NEUTS BAND # BLD: 3.99 K/UL (ref 1.7–7)
NEUTS SEG NFR BLD: 78 %
OVALOCYTES BLD QL SMEAR: ABNORMAL
P AXIS: 23
PDW BLD AUTO: 8.9 FL (ref 9.4–12.3)
PLAT MORPH BLD: NORMAL
PLATELET # BLD AUTO: 306 K/UL (ref 150–369)
PLATELET # BLD EST: ABNORMAL 10*3/UL
PLATELET CLUMP BLD QL SMEAR: PRESENT
POIKILOCYTOSIS BLD QL SMEAR: ABNORMAL
POTASSIUM SERPL-SCNC: 4.6 MEQ/L (ref 3.6–5.1)
PR INTERVAL: 134
PROT SERPL-MCNC: 6 G/DL (ref 6–8.2)
QRS DURATION: 74
QT INTERVAL: 364
QTC CALCULATION(BAZETT): 433
R AXIS: 18
RBC # BLD AUTO: 4.13 M/UL (ref 3.93–5.22)
SCHISTOCYTES BLD QL SMEAR: ABNORMAL
SODIUM SERPL-SCNC: 143 MEQ/L (ref 136–144)
T WAVE AXIS: 12
VARIANT LYMPHS # BLD MANUAL: 0.05 K/UL
VARIANT LYMPHS NFR BLD: 1 %
VENTRICULAR RATE: 85
WBC # BLD AUTO: 5.11 K/UL (ref 3.8–10.5)

## 2021-01-15 PROCEDURE — 20600000 HC ROOM AND CARE INTERMEDIATE/TELEMETRY

## 2021-01-15 PROCEDURE — 63700000 HC SELF-ADMINISTRABLE DRUG: Performed by: STUDENT IN AN ORGANIZED HEALTH CARE EDUCATION/TRAINING PROGRAM

## 2021-01-15 PROCEDURE — 63700000 HC SELF-ADMINISTRABLE DRUG: Performed by: PHYSICIAN ASSISTANT

## 2021-01-15 PROCEDURE — 80053 COMPREHEN METABOLIC PANEL: CPT | Performed by: STUDENT IN AN ORGANIZED HEALTH CARE EDUCATION/TRAINING PROGRAM

## 2021-01-15 PROCEDURE — 63700000 HC SELF-ADMINISTRABLE DRUG: Performed by: INTERNAL MEDICINE

## 2021-01-15 PROCEDURE — 63600000 HC DRUGS/DETAIL CODE: Performed by: STUDENT IN AN ORGANIZED HEALTH CARE EDUCATION/TRAINING PROGRAM

## 2021-01-15 PROCEDURE — 85025 COMPLETE CBC W/AUTO DIFF WBC: CPT | Performed by: STUDENT IN AN ORGANIZED HEALTH CARE EDUCATION/TRAINING PROGRAM

## 2021-01-15 PROCEDURE — 99233 SBSQ HOSP IP/OBS HIGH 50: CPT | Mod: CR | Performed by: STUDENT IN AN ORGANIZED HEALTH CARE EDUCATION/TRAINING PROGRAM

## 2021-01-15 RX ORDER — ACETAMINOPHEN 325 MG/1
650 TABLET ORAL EVERY 6 HOURS PRN
Status: DISCONTINUED | OUTPATIENT
Start: 2021-01-15 | End: 2021-01-20 | Stop reason: HOSPADM

## 2021-01-15 RX ORDER — BENZONATATE 100 MG/1
100 CAPSULE ORAL 3 TIMES DAILY PRN
Status: DISCONTINUED | OUTPATIENT
Start: 2021-01-15 | End: 2021-01-20

## 2021-01-15 RX ADMIN — BENZONATATE 100 MG: 100 CAPSULE ORAL at 21:33

## 2021-01-15 RX ADMIN — ENOXAPARIN SODIUM 40 MG: 40 INJECTION SUBCUTANEOUS at 12:55

## 2021-01-15 RX ADMIN — GUAIFENESIN 200 MG: 100 SOLUTION ORAL at 21:33

## 2021-01-15 RX ADMIN — ALBUTEROL SULFATE 2 PUFF: 90 AEROSOL, METERED RESPIRATORY (INHALATION) at 12:58

## 2021-01-15 RX ADMIN — GUAIFENESIN 200 MG: 100 SOLUTION ORAL at 09:16

## 2021-01-15 RX ADMIN — DEXAMETHASONE 6 MG: 6 TABLET ORAL at 09:16

## 2021-01-15 RX ADMIN — BENZONATATE 100 MG: 100 CAPSULE ORAL at 17:04

## 2021-01-15 RX ADMIN — ALBUTEROL SULFATE 2 PUFF: 90 AEROSOL, METERED RESPIRATORY (INHALATION) at 21:34

## 2021-01-15 RX ADMIN — GUAIFENESIN 200 MG: 100 SOLUTION ORAL at 16:16

## 2021-01-15 RX ADMIN — ACETAMINOPHEN 650 MG: 325 TABLET, FILM COATED ORAL at 11:22

## 2021-01-15 RX ADMIN — ACETAMINOPHEN 650 MG: 325 TABLET, FILM COATED ORAL at 21:33

## 2021-01-15 NOTE — ASSESSMENT & PLAN NOTE
Patient with poss pneumonia 2/2 covid 19  CXR indeterminate  CoVid test: positive on 1/7     Continuous pulse ox - stable o2 requirement  trend inflammatory markers per covid protocol  Continue bronchodilators q6hprn  Decadron - 6mg - Day 2  No Remdesivir   Continue supplemental O2 to maintain SpO2>90%  ID consulted

## 2021-01-15 NOTE — CONSULTS
Nutrition Status Classification: Mild nutritional compromise     Clinical Course: Patient is a 50 y.o. female who was admitted on 1/14/2021 with a diagnosis of Shortness of breath [R06.02]  Hypokalemia [E87.6]  Hypoxia [R09.02]  COVID-19 [U07.1].   Past Medical History:   Diagnosis Date   • Hypertension      No past surgical history on file.    Nutrition Interventions/Recommendations:   1. Continue cardiac diet, add lactose restriction if needed  2. If oral intake is <50%, add nutrition supplements for additional kcals, PRO  3. Treat labs/lytes prn  4. MVI with minerals          Dietary Orders   (From admission, onward)             Start     Ordered    01/14/21 0933  Adult Diet Regular; Cardiac (Low Sodium/Low Fat); RD/LDN may adjust order  Diet effective now     Question Answer Comment   Diet Texture Regular    Other Restriction(s): Cardiac (Low Sodium/Low Fat)    Delegation of Authority. Diet orders written by PA/CRNPs may not be adjusted by RD/LDNs. RD/LDN may adjust order        01/14/21 0932                Reason for Assessment  Reason For Assessment: identified at risk by screening criteria, per organizational policy  Diagnosis: infection/sepsis    MST Nutrition Screen Tool  Has patient lost weight without trying?: 0-->Yes  If yes,how much weight has been lost?: 1-->2-13 pounds  Has patient been eating poorly due to decreased appetite?: 1-->Yes  MST Nutrition Screen Score: 2    Nutrition/Diet History  Typical Food/Fluid Intake: from home   Appetite Prior to Admission: Poor-0-25%  Intake (%): 50%(omelette, Scottish toast, home fries)  Functional Status: ambulatory  Food Allergies: (NKFA)  Factors Affecting Nutritional Intake: anorexia, taste altered    Physical Findings  Overall Physical Appearance: (not seen )  Last Bowel Movement: 01/14/21  Skin: (no active wounds )    Last Bowel Movement: 01/14/21    Nutrition Order  Nutrition Order Review: meets nutritional requirements  Nutrition Order Comments: cardiac  "    Anthropometrics  Height: 165.1 cm (5' 5\")           Current Weight  Weight Method: Bed scale  Weight: 82.7 kg (182 lb 6.4 oz)    Ideal Body Weight (IBW)  Ideal Body Weight (IBW) (kg): 57.29  % Ideal Body Weight: 151.23    Usual Body Weight (UBW)  Usual Body Weight: 86.6 kg (191 lb)  % of Usual Body Weight Assessment: 85-95% - mild deficit  Weight Loss: unintentional  Time Frame: 1 Week    Body Mass Index (BMI)  BMI (Calculated): 30.4  BMI (kg/m2): 31.85  BMI Assessment: BMI 30-34.9: obesity grade I                        Labs/Procedures/Meds  Lab Results Reviewed: reviewed, pertinent      Results from last 7 days   Lab Units 01/15/21  0555 01/14/21  0754   SODIUM mEQ/L 143 142   POTASSIUM mEQ/L 4.6 3.2*   CHLORIDE mEQ/L 106 101   CO2 mEQ/L 28 29   BUN mg/dL 15 13   CREATININE mg/dL 0.8 0.9   GLUCOSE mg/dL 132* 110*   CALCIUM mg/dL 8.7* 8.6*      Results from last 7 days   Lab Units 01/15/21  0555 01/14/21  0754   ALK PHOS IU/L 38 38   BILIRUBIN TOTAL mg/dL 0.6 1.0   ALBUMIN g/dL 3.4 3.8   ALT IU/L 52 41   AST IU/L 61* 53*          No results found for: HGBA1C  No results found for: [id number]  Lab Results   Component Value Date    CALCIUM 8.7 (L) 01/15/2021    PHOS 2.7 01/14/2021     Results from last 7 days   Lab Units 01/15/21  0555 01/14/21  0754   WBC K/uL 5.11 5.08   HEMOGLOBIN g/dL 12.3 13.1   HEMATOCRIT % 36.9 39.5   PLATELETS K/uL 306 281               Results from last 7 days   Lab Units 01/14/21  0754   MAGNESIUM mg/dL 2.5          Diagnostic Tests/Procedures  Diagnostic Test/Procedure Reviewed: reviewed, pertinent    Medications  Pertinent Medications Reviewed: reviewed, pertinent  • dexAMETHasone  6 mg oral Daily   • enoxaparin  40 mg subcutaneous q24h INT         Weights (last 7 days)     Date/Time   Weight    01/14/21 1748   82.7 kg (182 lb 6.4 oz)    01/14/21 0933   82.7 kg (182 lb 6.4 oz)    01/14/21 0718   86.6 kg (191 lb)            Clinical Comments: Chart rev'd for mst score >/= 2. Admitted " with SOB r/t Covid 19, possible pna. Spoke with pt on phone, reports taste changes for 1 week PTA, didn't eat much during that time and had wt loss of 10# or 5% in 1 week. Taste now returning, pt ate 50% omelette, [place] toast, home fries for breakfast. Denies n/v. Did have some diarrhea last night after eating ice cream (is sometimes sensitive to lactose). No diarrhea today. BMI is 30, obese class I. Will continue to follow for nutriiton needs.       Date: 01/15/21  Signature: JOSÉ MIGUEL Tristan     Per [facility] COVID pt visitation restrictions, RD not able to enter room.

## 2021-01-15 NOTE — PROGRESS NOTES
Hospital Medicine Service -  Daily Progress Note       SUBJECTIVE   Seen and examined at bedside  No new complaints  Headache this morning     OBJECTIVE      Vital signs in last 24 hours:  Temp:  [36.4 °C (97.5 °F)-37.1 °C (98.8 °F)] 36.7 °C (98 °F)  Heart Rate:  [66-90] 70  Resp:  [20] 20  BP: (102-123)/(66-77) 109/68  No intake or output data in the 24 hours ending 01/15/21 1221    PHYSICAL EXAMINATION      Constitutional: no acute distress, well developed  Eyes:  EOMI, non-icteric   ENMT:  moist mucous membranes, no JVD  CV:  RRR, no murmurs detected  Pulm:  normal air entry, crackles   GI:  Bowel sounds are normal, soft,   MSK:  no cyanosis, clubbing, edema  Neuro:  awake, alert,        LINES, CATHETERS, DRAINS, AIRWAYS, AND WOUNDS   Lines, Drains, Airways, Wounds:  Peripheral IV 01/14/21 Left Antecubital (Active)   Number of days: 1       Comments:      LABS / IMAGING / TELE      Labs    CBC:  Results from last 7 days   Lab Units 01/15/21  0555 01/14/21  0754   WBC K/uL 5.11 5.08   HEMOGLOBIN g/dL 12.3 13.1   HEMATOCRIT % 36.9 39.5   PLATELETS K/uL 306 281   DIFF TYPE  Manu Manu   NEUTROS PCT MAN % 78 76   LYMPHO PCT MAN % 13 17   MONO PCT MAN % 8 3   EOSINO PCT MAN % 0 0   BASOS PCT MAN % 0 0   BANDS PCT MAN %  --  2   SEGS ABS MAN K/uL 3.99 3.86   LYMPHO ABS MAN K/uL 0.66* 0.86*   MONO ABS MAN K/uL 0.41 0.15*   EOS ABS MAN K/uL 0.00* 0.00*   BASOS ABS MAN K/uL 0.00* 0.00*        CHEM:  Results from last 7 days   Lab Units 01/15/21  0555 01/14/21  0754   SODIUM mEQ/L 143 142   POTASSIUM mEQ/L 4.6 3.2*   CHLORIDE mEQ/L 106 101   CO2 mEQ/L 28 29   BUN mg/dL 15 13   CREATININE mg/dL 0.8 0.9   CALCIUM mg/dL 8.7* 8.6*   ALBUMIN g/dL 3.4 3.8   BILIRUBIN TOTAL mg/dL 0.6 1.0   ALK PHOS IU/L 38 38   ALT IU/L 52 41   AST IU/L 61* 53*   GLUCOSE mg/dL 132* 110*       Imaging  X-ray Chest 1 View    Result Date: 1/14/2021  Narrative: CLINICAL HISTORY:  Shortness of breath     Impression: IMPRESSION:  Mild bibasilar  opacity, possibly atelectasis related to poor inspiration.  Infiltrate is not excluded. COMMENT:  Portable chest x-ray was performed.  There are no prior studies for comparison.  The study is limited from a very poor inspiration.  Mild bibasilar opacities could represent atelectasis from poor inspiration.  Infiltrates are not excluded.  Cardiac size is normal.  There is no pleural effusion.     Imaging available reviewed independently       ASSESSMENT AND PLAN      Respiratory failure with hypoxia (CMS/HCC)  Assessment & Plan  Stable o2 requirement       COVID-19  Assessment & Plan  Patient with poss pneumonia 2/2 covid 19  CXR indeterminate  CoVid test: positive on 1/7     Continuous pulse ox - stable o2 requirement  trend inflammatory markers per covid protocol  Continue bronchodilators q6hprn  Decadron - 6mg - Day 2  No Remdesivir   Continue supplemental O2 to maintain SpO2>90%  ID consulted           VTE Assessment: Padua    VTE Prophylaxis Plan: Current anticoagulant orders:              enoxaparin (LOVENOX) syringe 40 mg  Every 24 hours interval                 Code Status: Full Code  Estimated Discharge Date: 1/16/2021  Disposition Planning: Maintain current LOC     Rogelio Rios MD  1/15/2021

## 2021-01-15 NOTE — PLAN OF CARE
Problem: Adult Inpatient Plan of Care  Goal: Readiness for Transition of Care  Intervention: Mutually Develop Transition Plan  Flowsheets (Taken 1/15/2021 1452)  Anticipated Discharge Disposition:   home with home health services   home with assistance   home without services  Equipment Needed After Discharge: none  Assistive Device/Animal Currently Used at Home: none  Anticipated Changes Related to Illness: none  Readmission Within the Last 30 Days: no previous admission in last 30 days  Patient/Family Anticipates Transition to:   home with family   home with help/services  Transportation Anticipated: family or friend will provide  Concerns to be Addressed: discharge planning     SW spoke w/ pt via bedside phone. Reviewed CCMSW role. Pt resides w/ her father. Pt independent PTA. Pt denies DME or home care services in the past. Pt confirmed her PCP and states she receives medications from [facility] w/o difficulty. Pt currently on 2L NC. Pt also receiving decadron. Pt unsure of dispo needs at this time. SW will continue to follow for home care and O2 needs

## 2021-01-15 NOTE — PROGRESS NOTES
Major Events:  none    Subjective:  Watching TV     Temp:  [36.4 °C (97.5 °F)-37 °C (98.6 °F)] 36.7 °C (98.1 °F)  Heart Rate:  [66-90] 83  Resp:  [20] 20  BP: (102-121)/(68-77) 117/68     SpO2 Readings from Last 3 Encounters:   01/15/21 99%     Physical Exam:  Skin: No rash  Sclera: Anicteric  Lungs: occ rales  CV: S1, S2 nl, no embolic changes  Abd: Soft, nt, no hsm  Extr: No jt eff, no edema  Neuro: Alert, lucid    Lab Results   Component Value Date    WBC 5.11 01/15/2021    HGB 12.3 01/15/2021    HCT 36.9 01/15/2021    MCV 89.3 01/15/2021     01/15/2021     Lab Results   Component Value Date    GLUCOSE 132 (H) 01/15/2021    CALCIUM 8.7 (L) 01/15/2021     01/15/2021    K 4.6 01/15/2021    CO2 28 01/15/2021     01/15/2021    BUN 15 01/15/2021    CREATININE 0.8 01/15/2021     Lab Results   Component Value Date    ALBUMIN 3.4 01/15/2021    BILITOT 0.6 01/15/2021    ALKPHOS 38 01/15/2021    AST 61 (H) 01/15/2021    ALT 52 01/15/2021    PROTEIN 6.0 01/15/2021     Microbiology Results     ** No results found for the last 720 hours. **        Imaging:     Indep Rev    X-ray Chest 1 View  Result Date: 1/14/2021  IMPRESSION:  Mild bibasilar opacity, possibly atelectasis related to poor inspiration.  Infiltrate is not excluded.     Impression    COVID  Mild A-a  On dex  stable    JOSE FRANCISCO De Los Santos MD  Pager [phone]

## 2021-01-15 NOTE — PATIENT CARE CONFERENCE
Care Progression Rounds Note  Date: 1/15/2021  Time: 10:05 AM     Patient Name: Toma Matson     Medical Record Number: [id number]   YOB: 1970  Sex: Female      Room/Bed: 4029    Admitting Diagnosis: Shortness of breath [R06.02]  Hypokalemia [E87.6]  Hypoxia [R09.02]  COVID-19 [U07.1]   Admit Date/Time: 1/14/2021  7:07 AM    Primary Diagnosis: No Principal Problem: There is no principal problem currently on the Problem List. Please update the Problem List and refresh.  Principal Problem: No Principal Problem: There is no principal problem currently on the Problem List. Please update the Problem List and refresh.    GMLOS: pending  Anticipated Discharge Date: 1/16/2021    AM-PAC  Mobility Score:      Discharge Planning:       Barriers to Discharge:  Barriers to Discharge: Medical issues not resolved    Participants:  social work/services, , nursing

## 2021-01-16 LAB
ALBUMIN SERPL-MCNC: 3.5 G/DL (ref 3.4–5)
ALP SERPL-CCNC: 38 IU/L (ref 35–126)
ALT SERPL-CCNC: 53 IU/L (ref 11–54)
ANION GAP SERPL CALC-SCNC: 9 MEQ/L (ref 3–15)
ANISOCYTOSIS BLD QL SMEAR: ABNORMAL
AST SERPL-CCNC: 42 IU/L (ref 15–41)
BASOPHILS # BLD: 0 K/UL (ref 0.01–0.1)
BASOPHILS NFR BLD: 0 %
BILIRUB SERPL-MCNC: 0.5 MG/DL (ref 0.3–1.2)
BUN SERPL-MCNC: 20 MG/DL (ref 8–20)
CALCIUM SERPL-MCNC: 8.7 MG/DL (ref 8.9–10.3)
CHLORIDE SERPL-SCNC: 106 MEQ/L (ref 98–109)
CK SERPL-CCNC: 61 U/L (ref 15–200)
CO2 SERPL-SCNC: 26 MEQ/L (ref 22–32)
CREAT SERPL-MCNC: 0.8 MG/DL (ref 0.6–1.1)
CRP SERPL-MCNC: 21.3 MG/L
DIFFERENTIAL METHOD BLD: ABNORMAL
EOSINOPHIL # BLD: 0 K/UL (ref 0.04–0.36)
EOSINOPHIL NFR BLD: 0 %
ERYTHROCYTE [DISTWIDTH] IN BLOOD BY AUTOMATED COUNT: 12.1 % (ref 11.7–14.4)
FERRITIN SERPL-MCNC: 325 NG/ML (ref 11–250)
GFR SERPL CREATININE-BSD FRML MDRD: >60 ML/MIN/1.73M*2
GLUCOSE SERPL-MCNC: 129 MG/DL (ref 70–99)
HCT VFR BLDCO AUTO: 36 % (ref 35–45)
HGB BLD-MCNC: 12.1 G/DL (ref 11.8–15.7)
LDH SERPL L TO P-CCNC: 277 IU/L (ref 98–192)
LYMPHOCYTES # BLD: 0.21 K/UL (ref 1.2–3.5)
LYMPHOCYTES NFR BLD: 3 %
MCH RBC QN AUTO: 30.4 PG (ref 28–33.2)
MCHC RBC AUTO-ENTMCNC: 33.6 G/DL (ref 32.2–35.5)
MCV RBC AUTO: 90.5 FL (ref 83–98)
MONOCYTES # BLD: 0.71 K/UL (ref 0.28–0.8)
MONOCYTES NFR BLD: 10 %
MYELOCYTES # BLD MANUAL: 0.07 K/UL
MYELOCYTES NFR BLD MANUAL: 1 %
NEUTS BAND # BLD: 6 K/UL (ref 1.7–7)
NEUTS SEG NFR BLD: 85 %
PDW BLD AUTO: 9.3 FL (ref 9.4–12.3)
PLATELET # BLD AUTO: 395 K/UL (ref 150–369)
PLATELET # BLD EST: ABNORMAL 10*3/UL
PLATELET CLUMP BLD QL SMEAR: PRESENT
POTASSIUM SERPL-SCNC: 3.9 MEQ/L (ref 3.6–5.1)
PROT SERPL-MCNC: 6.1 G/DL (ref 6–8.2)
RBC # BLD AUTO: 3.98 M/UL (ref 3.93–5.22)
SODIUM SERPL-SCNC: 141 MEQ/L (ref 136–144)
VARIANT LYMPHS # BLD MANUAL: 0.07 K/UL
VARIANT LYMPHS NFR BLD: 1 %
WBC # BLD AUTO: 7.06 K/UL (ref 3.8–10.5)

## 2021-01-16 PROCEDURE — 63700000 HC SELF-ADMINISTRABLE DRUG: Performed by: HOSPITALIST

## 2021-01-16 PROCEDURE — 83615 LACTATE (LD) (LDH) ENZYME: CPT | Performed by: STUDENT IN AN ORGANIZED HEALTH CARE EDUCATION/TRAINING PROGRAM

## 2021-01-16 PROCEDURE — 99232 SBSQ HOSP IP/OBS MODERATE 35: CPT | Mod: CR | Performed by: HOSPITALIST

## 2021-01-16 PROCEDURE — 63700000 HC SELF-ADMINISTRABLE DRUG: Performed by: INTERNAL MEDICINE

## 2021-01-16 PROCEDURE — 80053 COMPREHEN METABOLIC PANEL: CPT | Performed by: STUDENT IN AN ORGANIZED HEALTH CARE EDUCATION/TRAINING PROGRAM

## 2021-01-16 PROCEDURE — 63600000 HC DRUGS/DETAIL CODE: Performed by: STUDENT IN AN ORGANIZED HEALTH CARE EDUCATION/TRAINING PROGRAM

## 2021-01-16 PROCEDURE — 82550 ASSAY OF CK (CPK): CPT | Performed by: STUDENT IN AN ORGANIZED HEALTH CARE EDUCATION/TRAINING PROGRAM

## 2021-01-16 PROCEDURE — 63700000 HC SELF-ADMINISTRABLE DRUG: Performed by: PHYSICIAN ASSISTANT

## 2021-01-16 PROCEDURE — 82728 ASSAY OF FERRITIN: CPT | Performed by: STUDENT IN AN ORGANIZED HEALTH CARE EDUCATION/TRAINING PROGRAM

## 2021-01-16 PROCEDURE — 85025 COMPLETE CBC W/AUTO DIFF WBC: CPT | Performed by: STUDENT IN AN ORGANIZED HEALTH CARE EDUCATION/TRAINING PROGRAM

## 2021-01-16 PROCEDURE — 20600000 HC ROOM AND CARE INTERMEDIATE/TELEMETRY

## 2021-01-16 PROCEDURE — 63700000 HC SELF-ADMINISTRABLE DRUG: Performed by: STUDENT IN AN ORGANIZED HEALTH CARE EDUCATION/TRAINING PROGRAM

## 2021-01-16 PROCEDURE — 86140 C-REACTIVE PROTEIN: CPT | Performed by: STUDENT IN AN ORGANIZED HEALTH CARE EDUCATION/TRAINING PROGRAM

## 2021-01-16 RX ORDER — PANTOPRAZOLE SODIUM 20 MG/1
20 TABLET, DELAYED RELEASE ORAL DAILY
Status: DISCONTINUED | OUTPATIENT
Start: 2021-01-16 | End: 2021-01-20 | Stop reason: HOSPADM

## 2021-01-16 RX ORDER — DOCUSATE SODIUM 100 MG/1
100 CAPSULE, LIQUID FILLED ORAL 2 TIMES DAILY
Status: DISCONTINUED | OUTPATIENT
Start: 2021-01-16 | End: 2021-01-20 | Stop reason: HOSPADM

## 2021-01-16 RX ADMIN — GUAIFENESIN 200 MG: 100 SOLUTION ORAL at 20:04

## 2021-01-16 RX ADMIN — PROBIOTIC PRODUCT - TAB 1 TABLET: TAB at 20:04

## 2021-01-16 RX ADMIN — DEXAMETHASONE 6 MG: 6 TABLET ORAL at 08:57

## 2021-01-16 RX ADMIN — ACETAMINOPHEN 650 MG: 325 TABLET, FILM COATED ORAL at 20:14

## 2021-01-16 RX ADMIN — ACETAMINOPHEN 650 MG: 325 TABLET, FILM COATED ORAL at 14:00

## 2021-01-16 RX ADMIN — BENZONATATE 100 MG: 100 CAPSULE ORAL at 08:57

## 2021-01-16 RX ADMIN — BENZONATATE 100 MG: 100 CAPSULE ORAL at 20:04

## 2021-01-16 RX ADMIN — ALBUTEROL SULFATE 2 PUFF: 90 AEROSOL, METERED RESPIRATORY (INHALATION) at 09:12

## 2021-01-16 RX ADMIN — ACETAMINOPHEN 650 MG: 325 TABLET, FILM COATED ORAL at 08:57

## 2021-01-16 RX ADMIN — POTASSIUM CHLORIDE 20 MEQ: 750 TABLET, EXTENDED RELEASE ORAL at 08:58

## 2021-01-16 RX ADMIN — PANTOPRAZOLE SODIUM 20 MG: 20 TABLET, DELAYED RELEASE ORAL at 12:32

## 2021-01-16 RX ADMIN — GUAIFENESIN 200 MG: 100 SOLUTION ORAL at 08:57

## 2021-01-16 RX ADMIN — ENOXAPARIN SODIUM 40 MG: 40 INJECTION SUBCUTANEOUS at 12:32

## 2021-01-16 RX ADMIN — GUAIFENESIN 200 MG: 100 SOLUTION ORAL at 13:59

## 2021-01-16 RX ADMIN — BENZONATATE 100 MG: 100 CAPSULE ORAL at 13:59

## 2021-01-16 RX ADMIN — PROBIOTIC PRODUCT - TAB 1 TABLET: TAB at 12:32

## 2021-01-16 NOTE — ASSESSMENT & PLAN NOTE
Patient with poss pneumonia 2/2 covid 19  CXR indeterminate  CoVid test: positive on 1/7     Continuous pulse ox - stable o2 requirement  trend inflammatory markers per covid protocol  Continue bronchodilators q6hprn  Decadron - 6mg - Day 2  No Remdesivir   Continue supplemental O2 to maintain SpO2>90%  ID consulted    1- and after   On decadron day 3 (started on 1-)  Not on Remd  ID f/u   Remain feel sick, dry cough  Cont lovenox for DVT ppx   Add Protonix for GI ppx while on Steroid   Add Probiotics for supportive care     1-17/18/19  Still persist dry cough  Add mucinex   Cont decadron  Slow improving check Cxr     1-20  Off O2 for 48 hours   Remain dry cough but better   Cxr no sig change   Taper Decadron  ID follow

## 2021-01-16 NOTE — PROGRESS NOTES
Hospital Medicine Service -  Daily Progress Note       SUBJECTIVE   Interval History: Toma Matson was seen and examined, no acute overnight event reported, patient reported no chest pain, N/V/D, overt bleeding.  Pt report remain feel sick, dry cough, noticed Pox dip down sig with cough and minimal exertion, pt need cont to be monitor at hospital for now.    OBJECTIVE      Vital signs in last 24 hours:  Vitals:    01/16/21 0905   BP: 118/71   Pulse: 74   Resp: 18   Temp: 36.6 °C (97.9 °F)   SpO2: 99%     No intake or output data in the 24 hours ending 01/16/21 1123    PHYSICAL EXAMINATION      General Appearance:  Awake, Alert, no distress     Head:  Normocephalic, without obvious abnormality, atraumatic   Neck: Supple      Lungs:   Clear to auscultation bilaterally  respirations unlabored  no rales  no wheezing     Heart:  Regular rhythm  S1 and S2 normal  no murmur, rub or gallop     Abdomen:   Soft, non-tender, no masses, no organomegaly     Vascular: Pulses 2+ and symmetric all extremities     Extremities: no calf tenderness      Behavior/Emotional: Appropriate, cooperative          LINES, CATHETERS, DRAINS, AIRWAYS, AND WOUNDS   Lines, Drains, Airways, Wounds:  Peripheral IV 01/14/21 Left Antecubital (Active)   Number of days: 2       Comments:      LABS / IMAGING / TELE      Labs  CMP Results       01/16/21 01/15/21 01/14/21                    0524 0555 0754          143 142         K 3.9 4.6 3.2         Cl 106 106 101         CO2 26 28 29         Glucose 129 132 110         BUN 20 15 13         Creatinine 0.8 0.8 0.9         Calcium 8.7 8.7 8.6         Anion Gap 9 9 12         AST 42 61 53         ALT 53 52 41         Albumin 3.5 3.4 3.8         EGFR >60.0 >60.0 >60.0         Comment for K at 0754 on 01/14/21: Results obtained on plasma. Plasma Potassium values may be up to 0.4 mEQ/L less than serum values. The differences may be greater for patients with high platelet or white cell counts.         CBC Results       01/16/21 01/15/21 01/14/21                    0524 0555 0754         WBC 7.06 5.11 5.08         RBC 3.98 4.13 4.36         HGB 12.1 12.3 13.1         HCT 36.0 36.9 39.5         MCV 90.5 89.3 90.6         MCH 30.4 29.8 30.0         MCHC 33.6 33.3 33.2          306 281                     Troponin I Results       01/14/21                          0754           Troponin I <0.02            <0.02                         PT/PTT Results       01/14/21                          0754           PT 12.3           INR 0.9           PTT 27           Comment for INR at 0754 on 01/14/21: INR has no defined significance when PT is within Reference Range.        Lab Results   Component Value Date    DDIMER 1.62 (H) 01/14/2021     Imaging  X-ray Chest 1 View    Result Date: 1/14/2021  IMPRESSION:  Mild bibasilar opacity, possibly atelectasis related to poor inspiration.  Infiltrate is not excluded. COMMENT:  Portable chest x-ray was performed.  There are no prior studies for comparison.  The study is limited from a very poor inspiration.  Mild bibasilar opacities could represent atelectasis from poor inspiration.  Infiltrates are not excluded.  Cardiac size is normal.  There is no pleural effusion.       ECG/Telemetry  Tele and or EKG was Reviewed     ASSESSMENT AND PLAN        Hypokalemia  Assessment & Plan  Replace prn    Respiratory failure with hypoxia (CMS/HCC)  Assessment & Plan  Stable o2 requirement     1- and after     Remain on 2 L NC Pox resting 96%  On my exam, pt had dry cough, with minimal exertion, I saw pt cont pox down to 70s, and very slow recover, I have to increase to 4L NC and to facilitate Pox to be above 92%, then  I turn back to 2L NC   Cont support O2 via NC and  Close monitor     COVID-19  Assessment & Plan  Patient with poss pneumonia 2/2 covid 19  CXR indeterminate  CoVid test: positive on 1/7     Continuous pulse ox - stable o2 requirement  trend inflammatory markers  per covid protocol  Continue bronchodilators q6hprn  Decadron - 6mg - Day 2  No Remdesivir   Continue supplemental O2 to maintain SpO2>90%  ID consulted    1- and after   On decadron day 3 (started on 1-)  Not on Remd  ID f/u   Remain feel sick, dry cough  Cont lovenox for DVT ppx   Add Protonix for GI ppx while on Steroid   Add Probiotics for supportive care              VTE Assessment: Padua    VTE Prophylaxis Plan: Continue current DVT Prophylaxis   Code Status: Full Code  Estimated Discharge Date: 1/16/2021  Disposition Planning: Pending progression    This patient note has been dictated using speech recognition software. Inadvertent speech recognition errors should be disregarded. Please do not hesitate to [place] [facility] [place]ifications.     Rigo Andrew MD  1/16/2021

## 2021-01-16 NOTE — PROGRESS NOTES
Major Events:  none    Subjective:  sleeping    Temp:  [36.3 °C (97.3 °F)-36.7 °C (98.1 °F)] 36.6 °C (97.9 °F)  Heart Rate:  [56-85] 74  Resp:  [18-20] 18  BP: ()/(61-71) 118/71     SpO2 Readings from Last 3 Encounters:   01/16/21 99%     Physical Exam:  Skin: No rash  Sclera: Anicteric  Lungs: occ rales  CV: S1, S2 nl, no embolic changes  Abd: Soft, nt, no hsm  Extr: No jt eff, no edema  Neuro: Alert, lucid    Lab Results   Component Value Date    WBC 7.06 01/16/2021    HGB 12.1 01/16/2021    HCT 36.0 01/16/2021    MCV 90.5 01/16/2021     (H) 01/16/2021     Lab Results   Component Value Date    GLUCOSE 129 (H) 01/16/2021    CALCIUM 8.7 (L) 01/16/2021     01/16/2021    K 3.9 01/16/2021    CO2 26 01/16/2021     01/16/2021    BUN 20 01/16/2021    CREATININE 0.8 01/16/2021     Lab Results   Component Value Date    ALBUMIN 3.5 01/16/2021    BILITOT 0.5 01/16/2021    ALKPHOS 38 01/16/2021    AST 42 (H) 01/16/2021    ALT 53 01/16/2021    PROTEIN 6.1 01/16/2021     Impression    COVID  Mild A-a  On dex  stable    YS MD Magali  Pager [phone]

## 2021-01-16 NOTE — ASSESSMENT & PLAN NOTE
Stable o2 requirement     1- and after     Remain on 2 L NC Pox resting 96%  On my exam, pt had dry cough, with minimal exertion, I saw pt cont pox down to 70s, and very slow recover, I have to increase to 4L NC and to facilitate Pox to be above 92%, then  I turn back to 2L NC   Cont support O2 via NC and  Close monitor     1-17  Remain on 2L nc with easy desat with minimal exertion or cough    1-18/20  Off O2 for 48 Hrs  Pox around 97%

## 2021-01-17 LAB
ALBUMIN SERPL-MCNC: 3.2 G/DL (ref 3.4–5)
ALP SERPL-CCNC: 39 IU/L (ref 35–126)
ALT SERPL-CCNC: 46 IU/L (ref 11–54)
ANION GAP SERPL CALC-SCNC: 9 MEQ/L (ref 3–15)
AST SERPL-CCNC: 29 IU/L (ref 15–41)
BASOPHILS # BLD: 0.01 K/UL (ref 0.01–0.1)
BASOPHILS NFR BLD: 0.1 %
BILIRUB SERPL-MCNC: 0.3 MG/DL (ref 0.3–1.2)
BUN SERPL-MCNC: 15 MG/DL (ref 8–20)
CALCIUM SERPL-MCNC: 8.3 MG/DL (ref 8.9–10.3)
CHLORIDE SERPL-SCNC: 105 MEQ/L (ref 98–109)
CO2 SERPL-SCNC: 26 MEQ/L (ref 22–32)
CREAT SERPL-MCNC: 0.6 MG/DL (ref 0.6–1.1)
DIFFERENTIAL METHOD BLD: ABNORMAL
EOSINOPHIL # BLD: 0 K/UL (ref 0.04–0.36)
EOSINOPHIL NFR BLD: 0 %
ERYTHROCYTE [DISTWIDTH] IN BLOOD BY AUTOMATED COUNT: 12 % (ref 11.7–14.4)
GFR SERPL CREATININE-BSD FRML MDRD: >60 ML/MIN/1.73M*2
GLUCOSE SERPL-MCNC: 107 MG/DL (ref 70–99)
HCT VFR BLDCO AUTO: 35.5 % (ref 35–45)
HGB BLD-MCNC: 11.7 G/DL (ref 11.8–15.7)
IMM GRANULOCYTES # BLD AUTO: 0.19 K/UL (ref 0–0.08)
IMM GRANULOCYTES NFR BLD AUTO: 2.7 %
LYMPHOCYTES # BLD: 0.94 K/UL (ref 1.2–3.5)
LYMPHOCYTES NFR BLD: 13.2 %
MAGNESIUM SERPL-MCNC: 2.1 MG/DL (ref 1.8–2.5)
MCH RBC QN AUTO: 29.5 PG (ref 28–33.2)
MCHC RBC AUTO-ENTMCNC: 33 G/DL (ref 32.2–35.5)
MCV RBC AUTO: 89.4 FL (ref 83–98)
MONOCYTES # BLD: 0.59 K/UL (ref 0.28–0.8)
MONOCYTES NFR BLD: 8.3 %
NEUTROPHILS # BLD: 5.38 K/UL (ref 1.7–7)
NEUTS SEG NFR BLD: 75.7 %
NRBC BLD-RTO: 0 %
PDW BLD AUTO: 9.2 FL (ref 9.4–12.3)
PHOSPHATE SERPL-MCNC: 3 MG/DL (ref 2.4–4.7)
PLATELET # BLD AUTO: 408 K/UL (ref 150–369)
POTASSIUM SERPL-SCNC: 3.6 MEQ/L (ref 3.6–5.1)
PROT SERPL-MCNC: 5.7 G/DL (ref 6–8.2)
RBC # BLD AUTO: 3.97 M/UL (ref 3.93–5.22)
SODIUM SERPL-SCNC: 140 MEQ/L (ref 136–144)
WBC # BLD AUTO: 7.11 K/UL (ref 3.8–10.5)

## 2021-01-17 PROCEDURE — 84100 ASSAY OF PHOSPHORUS: CPT | Performed by: HOSPITALIST

## 2021-01-17 PROCEDURE — 80053 COMPREHEN METABOLIC PANEL: CPT | Performed by: STUDENT IN AN ORGANIZED HEALTH CARE EDUCATION/TRAINING PROGRAM

## 2021-01-17 PROCEDURE — 99232 SBSQ HOSP IP/OBS MODERATE 35: CPT | Mod: CR | Performed by: HOSPITALIST

## 2021-01-17 PROCEDURE — 63700000 HC SELF-ADMINISTRABLE DRUG: Performed by: STUDENT IN AN ORGANIZED HEALTH CARE EDUCATION/TRAINING PROGRAM

## 2021-01-17 PROCEDURE — 63700000 HC SELF-ADMINISTRABLE DRUG: Performed by: PHYSICIAN ASSISTANT

## 2021-01-17 PROCEDURE — 83735 ASSAY OF MAGNESIUM: CPT | Performed by: HOSPITALIST

## 2021-01-17 PROCEDURE — 85025 COMPLETE CBC W/AUTO DIFF WBC: CPT | Performed by: STUDENT IN AN ORGANIZED HEALTH CARE EDUCATION/TRAINING PROGRAM

## 2021-01-17 PROCEDURE — 63700000 HC SELF-ADMINISTRABLE DRUG: Performed by: INTERNAL MEDICINE

## 2021-01-17 PROCEDURE — 63700000 HC SELF-ADMINISTRABLE DRUG: Performed by: HOSPITALIST

## 2021-01-17 PROCEDURE — 20600000 HC ROOM AND CARE INTERMEDIATE/TELEMETRY

## 2021-01-17 PROCEDURE — 63600000 HC DRUGS/DETAIL CODE: Performed by: STUDENT IN AN ORGANIZED HEALTH CARE EDUCATION/TRAINING PROGRAM

## 2021-01-17 RX ORDER — GUAIFENESIN 600 MG/1
600 TABLET, EXTENDED RELEASE ORAL 2 TIMES DAILY
Status: DISCONTINUED | OUTPATIENT
Start: 2021-01-17 | End: 2021-01-20

## 2021-01-17 RX ADMIN — GUAIFENESIN 600 MG: 600 TABLET ORAL at 20:39

## 2021-01-17 RX ADMIN — BENZONATATE 100 MG: 100 CAPSULE ORAL at 13:20

## 2021-01-17 RX ADMIN — PROBIOTIC PRODUCT - TAB 1 TABLET: TAB at 20:39

## 2021-01-17 RX ADMIN — POTASSIUM CHLORIDE 20 MEQ: 750 TABLET, EXTENDED RELEASE ORAL at 08:13

## 2021-01-17 RX ADMIN — ACETAMINOPHEN 650 MG: 325 TABLET, FILM COATED ORAL at 13:20

## 2021-01-17 RX ADMIN — PANTOPRAZOLE SODIUM 20 MG: 20 TABLET, DELAYED RELEASE ORAL at 08:13

## 2021-01-17 RX ADMIN — DEXAMETHASONE 6 MG: 6 TABLET ORAL at 08:13

## 2021-01-17 RX ADMIN — GUAIFENESIN 200 MG: 100 SOLUTION ORAL at 08:13

## 2021-01-17 RX ADMIN — ACETAMINOPHEN 650 MG: 325 TABLET, FILM COATED ORAL at 08:13

## 2021-01-17 RX ADMIN — PROBIOTIC PRODUCT - TAB 1 TABLET: TAB at 08:13

## 2021-01-17 RX ADMIN — ENOXAPARIN SODIUM 40 MG: 40 INJECTION SUBCUTANEOUS at 13:20

## 2021-01-17 RX ADMIN — BENZONATATE 100 MG: 100 CAPSULE ORAL at 20:39

## 2021-01-17 RX ADMIN — ALBUTEROL SULFATE 2 PUFF: 90 AEROSOL, METERED RESPIRATORY (INHALATION) at 13:19

## 2021-01-17 RX ADMIN — BENZONATATE 100 MG: 100 CAPSULE ORAL at 08:13

## 2021-01-17 RX ADMIN — GUAIFENESIN 200 MG: 100 SOLUTION ORAL at 13:20

## 2021-01-17 RX ADMIN — DOCUSATE SODIUM 100 MG: 100 CAPSULE, LIQUID FILLED ORAL at 08:13

## 2021-01-17 NOTE — PROGRESS NOTES
Hospital Medicine Service -  Daily Progress Note       SUBJECTIVE   Interval History: Toma Matson was seen and examined, no acute overnight event reported, patient reported no chest pain, N/V/D, overt bleeding, C/o remain dry cough and sob with exertion, cont decadron for now.      OBJECTIVE      Vital signs in last 24 hours:  Vitals:    01/17/21 0824   BP: 122/80   Pulse: 60   Resp: 18   Temp: 36.7 °C (98 °F)   SpO2: 98%     No intake or output data in the 24 hours ending 01/17/21 1343    PHYSICAL EXAMINATION      General Appearance:  Awake, Alert, no distress     Head:  Normocephalic, without obvious abnormality, atraumatic   Neck: Supple      Lungs:   Clear to auscultation bilaterally  respirations unlabored  no rales  no wheezing     Heart:  Regular rhythm  S1 and S2 normal  no murmur, rub or gallop     Abdomen:   Soft, non-tender, no masses, no organomegaly     Vascular: Pulses 2+ and symmetric all extremities     Extremities: no calf tenderness      Behavior/Emotional: Appropriate, cooperative          LINES, CATHETERS, DRAINS, AIRWAYS, AND WOUNDS   Lines, Drains, Airways, Wounds:  Peripheral IV 01/14/21 Left Antecubital (Active)   Number of days: 3       Comments:      LABS / IMAGING / TELE      Labs  CMP Results       01/17/21 01/16/21 01/15/21                    0545 0524 0555          141 143         K 3.6 3.9 4.6         Cl 105 106 106         CO2 26 26 28         Glucose 107 129 132         BUN 15 20 15         Creatinine 0.6 0.8 0.8         Calcium 8.3 8.7 8.7         Anion Gap 9 9 9         AST 29 42 61         ALT 46 53 52         Albumin 3.2 3.5 3.4         EGFR >60.0 >60.0 >60.0                     CBC Results       01/17/21 01/16/21 01/15/21                    0545 0524 0555         WBC 7.11 7.06 5.11         RBC 3.97 3.98 4.13         HGB 11.7 12.1 12.3         HCT 35.5 36.0 36.9         MCV 89.4 90.5 89.3         MCH 29.5 30.4 29.8         MCHC 33.0 33.6 33.3           395 306                     Troponin I Results       01/14/21                          0754           Troponin I <0.02            <0.02                         PT/PTT Results       01/14/21                          0754           PT 12.3           INR 0.9           PTT 27           Comment for INR at 0754 on 01/14/21: INR has no defined significance when PT is within Reference Range.        Lab Results   Component Value Date    DDIMER 1.62 (H) 01/14/2021     Imaging  X-ray Chest 1 View    Result Date: 1/14/2021  IMPRESSION:  Mild bibasilar opacity, possibly atelectasis related to poor inspiration.  Infiltrate is not excluded. COMMENT:  Portable chest x-ray was performed.  There are no prior studies for comparison.  The study is limited from a very poor inspiration.  Mild bibasilar opacities could represent atelectasis from poor inspiration.  Infiltrates are not excluded.  Cardiac size is normal.  There is no pleural effusion.       ECG/Telemetry  Tele and or EKG was Reviewed     ASSESSMENT AND PLAN        Hypokalemia  Assessment & Plan  Replace prn    Respiratory failure with hypoxia (CMS/HCC)  Assessment & Plan  Stable o2 requirement     1- and after     Remain on 2 L NC Pox resting 96%  On my exam, pt had dry cough, with minimal exertion, I saw pt cont pox down to 70s, and very slow recover, I have to increase to 4L NC and to facilitate Pox to be above 92%, then  I turn back to 2L NC   Cont support O2 via NC and  Close monitor     1-17  Remain on 2L nc with easy desat with minimal exertion or cough    COVID-19  Assessment & Plan  Patient with poss pneumonia 2/2 covid 19  CXR indeterminate  CoVid test: positive on 1/7     Continuous pulse ox - stable o2 requirement  trend inflammatory markers per covid protocol  Continue bronchodilators q6hprn  Decadron - 6mg - Day 2  No Remdesivir   Continue supplemental O2 to maintain SpO2>90%  ID consulted    1- and after   On decadron day 3 (started on  1-)  Not on Remd  ID f/u   Remain feel sick, dry cough  Cont lovenox for DVT ppx   Add Protonix for GI ppx while on Steroid   Add Probiotics for supportive care     1-17  Still persist dry cough  Add mucinex   Cont decadron               VTE Assessment: Padua    VTE Prophylaxis Plan: Continue current DVT Prophylaxis   Code Status: Full Code  Estimated Discharge Date: 1/16/2021  Disposition Planning: Pending progression    This patient note has been dictated using speech recognition software. Inadvertent speech recognition errors should be disregarded. Please do not hesitate to call [facility] [place] office for clarifications.     Rigo Andrew MD  1/17/2021

## 2021-01-18 ENCOUNTER — APPOINTMENT (INPATIENT)
Dept: RADIOLOGY | Facility: HOSPITAL | Age: 51
DRG: 177 | End: 2021-01-18
Attending: HOSPITALIST
Payer: COMMERCIAL

## 2021-01-18 LAB
ALBUMIN SERPL-MCNC: 3.2 G/DL (ref 3.4–5)
ALP SERPL-CCNC: 47 IU/L (ref 35–126)
ALT SERPL-CCNC: 49 IU/L (ref 11–54)
ANION GAP SERPL CALC-SCNC: 10 MEQ/L (ref 3–15)
AST SERPL-CCNC: 32 IU/L (ref 15–41)
BASOPHILS # BLD: 0.01 K/UL (ref 0.01–0.1)
BASOPHILS NFR BLD: 0.1 %
BILIRUB SERPL-MCNC: 0.4 MG/DL (ref 0.3–1.2)
BUN SERPL-MCNC: 17 MG/DL (ref 8–20)
CALCIUM SERPL-MCNC: 8.6 MG/DL (ref 8.9–10.3)
CHLORIDE SERPL-SCNC: 106 MEQ/L (ref 98–109)
CK SERPL-CCNC: 38 U/L (ref 15–200)
CO2 SERPL-SCNC: 24 MEQ/L (ref 22–32)
CREAT SERPL-MCNC: 0.7 MG/DL (ref 0.6–1.1)
CRP SERPL-MCNC: 12.5 MG/L
DIFFERENTIAL METHOD BLD: ABNORMAL
EOSINOPHIL # BLD: 0.01 K/UL (ref 0.04–0.36)
EOSINOPHIL NFR BLD: 0.1 %
ERYTHROCYTE [DISTWIDTH] IN BLOOD BY AUTOMATED COUNT: 12.2 % (ref 11.7–14.4)
FERRITIN SERPL-MCNC: 319 NG/ML (ref 11–250)
GFR SERPL CREATININE-BSD FRML MDRD: >60 ML/MIN/1.73M*2
GLUCOSE SERPL-MCNC: 105 MG/DL (ref 70–99)
HCT VFR BLDCO AUTO: 34.4 % (ref 35–45)
HGB BLD-MCNC: 11.5 G/DL (ref 11.8–15.7)
IMM GRANULOCYTES # BLD AUTO: 0.29 K/UL (ref 0–0.08)
IMM GRANULOCYTES NFR BLD AUTO: 3.9 %
LDH SERPL L TO P-CCNC: 200 IU/L (ref 98–192)
LYMPHOCYTES # BLD: 1.08 K/UL (ref 1.2–3.5)
LYMPHOCYTES NFR BLD: 14.6 %
MAGNESIUM SERPL-MCNC: 2.1 MG/DL (ref 1.8–2.5)
MCH RBC QN AUTO: 29.9 PG (ref 28–33.2)
MCHC RBC AUTO-ENTMCNC: 33.4 G/DL (ref 32.2–35.5)
MCV RBC AUTO: 89.6 FL (ref 83–98)
MONOCYTES # BLD: 0.52 K/UL (ref 0.28–0.8)
MONOCYTES NFR BLD: 7 %
NEUTROPHILS # BLD: 5.47 K/UL (ref 1.7–7)
NEUTS SEG NFR BLD: 74.3 %
NRBC BLD-RTO: 0.3 %
PDW BLD AUTO: 9.1 FL (ref 9.4–12.3)
PHOSPHATE SERPL-MCNC: 2.8 MG/DL (ref 2.4–4.7)
PLATELET # BLD AUTO: 376 K/UL (ref 150–369)
POTASSIUM SERPL-SCNC: 3.6 MEQ/L (ref 3.6–5.1)
PROT SERPL-MCNC: 5.5 G/DL (ref 6–8.2)
RBC # BLD AUTO: 3.84 M/UL (ref 3.93–5.22)
SODIUM SERPL-SCNC: 140 MEQ/L (ref 136–144)
WBC # BLD AUTO: 7.38 K/UL (ref 3.8–10.5)

## 2021-01-18 PROCEDURE — 83735 ASSAY OF MAGNESIUM: CPT | Performed by: HOSPITALIST

## 2021-01-18 PROCEDURE — 63700000 HC SELF-ADMINISTRABLE DRUG: Performed by: HOSPITALIST

## 2021-01-18 PROCEDURE — 84100 ASSAY OF PHOSPHORUS: CPT | Performed by: HOSPITALIST

## 2021-01-18 PROCEDURE — 83615 LACTATE (LD) (LDH) ENZYME: CPT | Performed by: STUDENT IN AN ORGANIZED HEALTH CARE EDUCATION/TRAINING PROGRAM

## 2021-01-18 PROCEDURE — 20600000 HC ROOM AND CARE INTERMEDIATE/TELEMETRY

## 2021-01-18 PROCEDURE — 80053 COMPREHEN METABOLIC PANEL: CPT | Performed by: STUDENT IN AN ORGANIZED HEALTH CARE EDUCATION/TRAINING PROGRAM

## 2021-01-18 PROCEDURE — 82728 ASSAY OF FERRITIN: CPT | Performed by: STUDENT IN AN ORGANIZED HEALTH CARE EDUCATION/TRAINING PROGRAM

## 2021-01-18 PROCEDURE — 86140 C-REACTIVE PROTEIN: CPT | Performed by: STUDENT IN AN ORGANIZED HEALTH CARE EDUCATION/TRAINING PROGRAM

## 2021-01-18 PROCEDURE — 63700000 HC SELF-ADMINISTRABLE DRUG: Performed by: STUDENT IN AN ORGANIZED HEALTH CARE EDUCATION/TRAINING PROGRAM

## 2021-01-18 PROCEDURE — 63600000 HC DRUGS/DETAIL CODE: Performed by: STUDENT IN AN ORGANIZED HEALTH CARE EDUCATION/TRAINING PROGRAM

## 2021-01-18 PROCEDURE — 63700000 HC SELF-ADMINISTRABLE DRUG: Performed by: INTERNAL MEDICINE

## 2021-01-18 PROCEDURE — 99232 SBSQ HOSP IP/OBS MODERATE 35: CPT | Performed by: HOSPITALIST

## 2021-01-18 PROCEDURE — 85025 COMPLETE CBC W/AUTO DIFF WBC: CPT | Performed by: STUDENT IN AN ORGANIZED HEALTH CARE EDUCATION/TRAINING PROGRAM

## 2021-01-18 PROCEDURE — 82550 ASSAY OF CK (CPK): CPT | Performed by: STUDENT IN AN ORGANIZED HEALTH CARE EDUCATION/TRAINING PROGRAM

## 2021-01-18 PROCEDURE — 71045 X-RAY EXAM CHEST 1 VIEW: CPT

## 2021-01-18 RX ADMIN — GUAIFENESIN 600 MG: 600 TABLET ORAL at 08:40

## 2021-01-18 RX ADMIN — POTASSIUM CHLORIDE 20 MEQ: 750 TABLET, EXTENDED RELEASE ORAL at 09:33

## 2021-01-18 RX ADMIN — PANTOPRAZOLE SODIUM 20 MG: 20 TABLET, DELAYED RELEASE ORAL at 08:40

## 2021-01-18 RX ADMIN — PROBIOTIC PRODUCT - TAB 1 TABLET: TAB at 08:40

## 2021-01-18 RX ADMIN — DEXAMETHASONE 6 MG: 6 TABLET ORAL at 08:40

## 2021-01-18 RX ADMIN — ENOXAPARIN SODIUM 40 MG: 40 INJECTION SUBCUTANEOUS at 12:57

## 2021-01-18 RX ADMIN — PROBIOTIC PRODUCT - TAB 1 TABLET: TAB at 19:37

## 2021-01-18 RX ADMIN — GUAIFENESIN 600 MG: 600 TABLET ORAL at 19:37

## 2021-01-18 NOTE — PLAN OF CARE
Plan of Care Review  Plan of Care Reviewed With: patient  Progress: improving  Outcome Summary: Pt resting in bed. Ambulates in rooom independently. Pt refuses alarms. Pt on ra. No s/s of distress noted. Pt denies pain. VSS. Fall precautions maintained.

## 2021-01-18 NOTE — PATIENT CARE CONFERENCE
Care Progression Rounds Note  Date: 1/18/2021  Time: 10:07 AM     Patient Name: Toma Matson     Medical Record Number: [id number]   YOB: 1970  Sex: Female      Room/Bed: 4029    Admitting Diagnosis: Shortness of breath [R06.02]  Hypokalemia [E87.6]  Hypoxia [R09.02]  COVID-19 [U07.1]   Admit Date/Time: 1/14/2021  7:07 AM    Primary Diagnosis: No Principal Problem: There is no principal problem currently on the Problem List. Please update the Problem List and refresh.  Principal Problem: No Principal Problem: There is no principal problem currently on the Problem List. Please update the Problem List and refresh.    GMLOS: pending  Anticipated Discharge Date: 1/20/2021    AM-PAC  Mobility Score:      Discharge Planning:  Anticipated Discharge Disposition: home with home health services, home with assistance, home without services    Barriers to Discharge:  Barriers to Discharge: Medical issues not resolved    Participants:  social work/services, , nursing

## 2021-01-18 NOTE — PROGRESS NOTES
Major Events:  none    Subjective:  awake    Temp:  [36.4 °C (97.6 °F)-36.9 °C (98.4 °F)] 36.9 °C (98.4 °F)  Heart Rate:  [52-78] 78  Resp:  [18] 18  BP: (118-122)/(68-75) 119/72     SpO2 Readings from Last 3 Encounters:   01/18/21 96%     Physical Exam:  Skin: No rash  Sclera: Anicteric  Lungs: rales  CV: S1, S2 nl, no embolic changes  Abd: Soft, nt, no hsm  Extr: No jt eff, no edema  Neuro: Alert, lucid    Lab Results   Component Value Date    WBC 7.38 01/18/2021    HGB 11.5 (L) 01/18/2021    HCT 34.4 (L) 01/18/2021    MCV 89.6 01/18/2021     (H) 01/18/2021     Lab Results   Component Value Date    GLUCOSE 105 (H) 01/18/2021    CALCIUM 8.6 (L) 01/18/2021     01/18/2021    K 3.6 01/18/2021    CO2 24 01/18/2021     01/18/2021    BUN 17 01/18/2021    CREATININE 0.7 01/18/2021     Lab Results   Component Value Date    ALBUMIN 3.2 (L) 01/18/2021    BILITOT 0.4 01/18/2021    ALKPHOS 47 01/18/2021    AST 32 01/18/2021    ALT 49 01/18/2021    PROTEIN 5.5 (L) 01/18/2021     Impression    COVID  Mild A-a  On dex  stable    JOSE FRANCISCO De Los Santos MD  Pager [phone]

## 2021-01-18 NOTE — PROGRESS NOTES
Hospital Medicine Service -  Daily Progress Note       SUBJECTIVE   Interval History: Toma Matson was seen and examined, no acute overnight event reported, patient reported no chest pain, N/V/D, overt bleeding. Remain  Dry cough and eaily desat with cough and ambulation, cont dex. Slow improving check Cxr    OBJECTIVE      Vital signs in last 24 hours:  Vitals:    01/18/21 1155   BP:    Pulse:    Resp:    Temp:    SpO2: 96%     No intake or output data in the 24 hours ending 01/18/21 1314    PHYSICAL EXAMINATION      General Appearance:  Awake, Alert, no distress     Head:  Normocephalic, without obvious abnormality, atraumatic   Neck: Supple      Lungs:   Clear to auscultation bilaterally  respirations unlabored  no rales  no wheezing     Heart:  Regular rhythm  S1 and S2 normal  no murmur, rub or gallop     Abdomen:   Soft, non-tender, no masses, no organomegaly     Vascular: Pulses 2+ and symmetric all extremities     Extremities: no calf tenderness      Behavior/Emotional: Appropriate, cooperative          LINES, CATHETERS, DRAINS, AIRWAYS, AND WOUNDS   Lines, Drains, Airways, Wounds:  Peripheral IV 01/14/21 Left Antecubital (Active)   Number of days: 4       Comments:      LABS / IMAGING / TELE      Labs  CMP Results       01/18/21 01/17/21 01/16/21                    0601 0545 0524          140 141         K 3.6 3.6 3.9         Cl 106 105 106         CO2 24 26 26         Glucose 105 107 129         BUN 17 15 20         Creatinine 0.7 0.6 0.8         Calcium 8.6 8.3 8.7         Anion Gap 10 9 9         AST 32 29 42         ALT 49 46 53         Albumin 3.2 3.2 3.5         EGFR >60.0 >60.0 >60.0                     CBC Results       01/18/21 01/17/21 01/16/21                    0601 0545 0524         WBC 7.38 7.11 7.06         RBC 3.84 3.97 3.98         HGB 11.5 11.7 12.1         HCT 34.4 35.5 36.0         MCV 89.6 89.4 90.5         MCH 29.9 29.5 30.4         MCHC 33.4 33.0 33.6           408 395                     Troponin I Results       01/14/21                          0754           Troponin I <0.02            <0.02                         PT/PTT Results       01/14/21                          0754           PT 12.3           INR 0.9           PTT 27           Comment for INR at 0754 on 01/14/21: INR has no defined significance when PT is within Reference Range.        Lab Results   Component Value Date    DDIMER 1.62 (H) 01/14/2021     Imaging  X-ray Chest 1 View    Result Date: 1/14/2021  IMPRESSION:  Mild bibasilar opacity, possibly atelectasis related to poor inspiration.  Infiltrate is not excluded. COMMENT:  Portable chest x-ray was performed.  There are no prior studies for comparison.  The study is limited from a very poor inspiration.  Mild bibasilar opacities could represent atelectasis from poor inspiration.  Infiltrates are not excluded.  Cardiac size is normal.  There is no pleural effusion.       ECG/Telemetry  Tele and or EKG was Reviewed     ASSESSMENT AND PLAN        Hypokalemia  Assessment & Plan  Replace prn    Respiratory failure with hypoxia (CMS/HCC)  Assessment & Plan  Stable o2 requirement     1- and after     Remain on 2 L NC Pox resting 96%  On my exam, pt had dry cough, with minimal exertion, I saw pt cont pox down to 70s, and very slow recover, I have to increase to 4L NC and to facilitate Pox to be above 92%, then  I turn back to 2L NC   Cont support O2 via NC and  Close monitor     1-17  Remain on 2L nc with easy desat with minimal exertion or cough    COVID-19  Assessment & Plan  Patient with poss pneumonia 2/2 covid 19  CXR indeterminate  CoVid test: positive on 1/7     Continuous pulse ox - stable o2 requirement  trend inflammatory markers per covid protocol  Continue bronchodilators q6hprn  Decadron - 6mg - Day 2  No Remdesivir   Continue supplemental O2 to maintain SpO2>90%  ID consulted    1- and after   On decadron day 3 (started on  1-)  Not on Remd  ID f/u   Remain feel sick, dry cough  Cont lovenox for DVT ppx   Add Protonix for GI ppx while on Steroid   Add Probiotics for supportive care     1-17/18  Still persist dry cough  Add mucinex   Cont decadron  Slow improving check Cxr            VTE Assessment: Padua    VTE Prophylaxis Plan: Continue current DVT Prophylaxis   Code Status: Full Code  Estimated Discharge Date: 1/20/2021  Disposition Planning: Pending progression    This patient note has been dictated using speech recognition software. Inadvertent speech recognition errors should be disregarded. Please do not hesitate [place] [facility] [place].     Rigo Andrew MD  1/18/2021

## 2021-01-19 LAB
ALBUMIN SERPL-MCNC: 3.3 G/DL (ref 3.4–5)
ALP SERPL-CCNC: 59 IU/L (ref 35–126)
ALT SERPL-CCNC: 63 IU/L (ref 11–54)
ANION GAP SERPL CALC-SCNC: 8 MEQ/L (ref 3–15)
AST SERPL-CCNC: 36 IU/L (ref 15–41)
BASOPHILS # BLD: 0.04 K/UL (ref 0.01–0.1)
BASOPHILS NFR BLD: 0.5 %
BILIRUB SERPL-MCNC: 0.5 MG/DL (ref 0.3–1.2)
BUN SERPL-MCNC: 19 MG/DL (ref 8–20)
CALCIUM SERPL-MCNC: 8.6 MG/DL (ref 8.9–10.3)
CHLORIDE SERPL-SCNC: 105 MEQ/L (ref 98–109)
CO2 SERPL-SCNC: 26 MEQ/L (ref 22–32)
CREAT SERPL-MCNC: 0.8 MG/DL (ref 0.6–1.1)
DIFFERENTIAL METHOD BLD: ABNORMAL
EOSINOPHIL # BLD: 0.01 K/UL (ref 0.04–0.36)
EOSINOPHIL NFR BLD: 0.1 %
ERYTHROCYTE [DISTWIDTH] IN BLOOD BY AUTOMATED COUNT: 12.4 % (ref 11.7–14.4)
GFR SERPL CREATININE-BSD FRML MDRD: >60 ML/MIN/1.73M*2
GLUCOSE SERPL-MCNC: 100 MG/DL (ref 70–99)
HCT VFR BLDCO AUTO: 34.7 % (ref 35–45)
HGB BLD-MCNC: 11.9 G/DL (ref 11.8–15.7)
IMM GRANULOCYTES # BLD AUTO: 0.35 K/UL (ref 0–0.08)
IMM GRANULOCYTES NFR BLD AUTO: 4.3 %
LYMPHOCYTES # BLD: 1.34 K/UL (ref 1.2–3.5)
LYMPHOCYTES NFR BLD: 16.3 %
MAGNESIUM SERPL-MCNC: 2.2 MG/DL (ref 1.8–2.5)
MCH RBC QN AUTO: 30.4 PG (ref 28–33.2)
MCHC RBC AUTO-ENTMCNC: 34.3 G/DL (ref 32.2–35.5)
MCV RBC AUTO: 88.5 FL (ref 83–98)
MONOCYTES # BLD: 0.56 K/UL (ref 0.28–0.8)
MONOCYTES NFR BLD: 6.8 %
NEUTROPHILS # BLD: 5.9 K/UL (ref 1.7–7)
NEUTS SEG NFR BLD: 72 %
NRBC BLD-RTO: 0.2 %
PDW BLD AUTO: 8.9 FL (ref 9.4–12.3)
PHOSPHATE SERPL-MCNC: 3.5 MG/DL (ref 2.4–4.7)
PLATELET # BLD AUTO: 408 K/UL (ref 150–369)
POTASSIUM SERPL-SCNC: 3.7 MEQ/L (ref 3.6–5.1)
PROT SERPL-MCNC: 5.7 G/DL (ref 6–8.2)
RBC # BLD AUTO: 3.92 M/UL (ref 3.93–5.22)
SODIUM SERPL-SCNC: 139 MEQ/L (ref 136–144)
WBC # BLD AUTO: 8.2 K/UL (ref 3.8–10.5)

## 2021-01-19 PROCEDURE — 63600000 HC DRUGS/DETAIL CODE: Performed by: STUDENT IN AN ORGANIZED HEALTH CARE EDUCATION/TRAINING PROGRAM

## 2021-01-19 PROCEDURE — 99232 SBSQ HOSP IP/OBS MODERATE 35: CPT | Performed by: HOSPITALIST

## 2021-01-19 PROCEDURE — 63700000 HC SELF-ADMINISTRABLE DRUG: Performed by: INTERNAL MEDICINE

## 2021-01-19 PROCEDURE — 20600000 HC ROOM AND CARE INTERMEDIATE/TELEMETRY

## 2021-01-19 PROCEDURE — 63700000 HC SELF-ADMINISTRABLE DRUG: Performed by: STUDENT IN AN ORGANIZED HEALTH CARE EDUCATION/TRAINING PROGRAM

## 2021-01-19 PROCEDURE — 80053 COMPREHEN METABOLIC PANEL: CPT | Performed by: STUDENT IN AN ORGANIZED HEALTH CARE EDUCATION/TRAINING PROGRAM

## 2021-01-19 PROCEDURE — 63700000 HC SELF-ADMINISTRABLE DRUG: Performed by: HOSPITALIST

## 2021-01-19 PROCEDURE — 84100 ASSAY OF PHOSPHORUS: CPT | Performed by: HOSPITALIST

## 2021-01-19 PROCEDURE — 83735 ASSAY OF MAGNESIUM: CPT | Performed by: HOSPITALIST

## 2021-01-19 PROCEDURE — 85025 COMPLETE CBC W/AUTO DIFF WBC: CPT | Performed by: STUDENT IN AN ORGANIZED HEALTH CARE EDUCATION/TRAINING PROGRAM

## 2021-01-19 PROCEDURE — 63700000 HC SELF-ADMINISTRABLE DRUG: Performed by: PHYSICIAN ASSISTANT

## 2021-01-19 RX ORDER — DEXAMETHASONE 6 MG/1
3 TABLET ORAL DAILY
Status: DISCONTINUED | OUTPATIENT
Start: 2021-01-20 | End: 2021-01-20 | Stop reason: HOSPADM

## 2021-01-19 RX ADMIN — GUAIFENESIN 600 MG: 600 TABLET ORAL at 08:40

## 2021-01-19 RX ADMIN — PROBIOTIC PRODUCT - TAB 1 TABLET: TAB at 20:34

## 2021-01-19 RX ADMIN — PANTOPRAZOLE SODIUM 20 MG: 20 TABLET, DELAYED RELEASE ORAL at 08:40

## 2021-01-19 RX ADMIN — ENOXAPARIN SODIUM 40 MG: 40 INJECTION SUBCUTANEOUS at 12:48

## 2021-01-19 RX ADMIN — PROBIOTIC PRODUCT - TAB 1 TABLET: TAB at 08:40

## 2021-01-19 RX ADMIN — GUAIFENESIN 200 MG: 100 SOLUTION ORAL at 20:34

## 2021-01-19 RX ADMIN — POTASSIUM CHLORIDE 20 MEQ: 750 TABLET, EXTENDED RELEASE ORAL at 06:58

## 2021-01-19 RX ADMIN — BENZONATATE 100 MG: 100 CAPSULE ORAL at 12:48

## 2021-01-19 RX ADMIN — GUAIFENESIN 200 MG: 100 SOLUTION ORAL at 08:40

## 2021-01-19 RX ADMIN — DEXAMETHASONE 6 MG: 6 TABLET ORAL at 08:40

## 2021-01-19 NOTE — PROGRESS NOTES
Patient: Toma LA Dano  Location: [facility] 4A 4029  MRN: [id number]  Today's date: 1/19/2021    Attempted to see patient for therapy. Spoke with hospitalist today thru secure chat.  Dr. Andrew to d/c PT order due to pt indep. in room, and has no current skilled inpt PT needs.

## 2021-01-19 NOTE — NURSING NOTE
Patient ambulatory on room air around the room, SpO2 remained between 94-98%. Pt reported mild SOB and cough. Encouraged deep breathing exercises and use of IS.

## 2021-01-19 NOTE — PROGRESS NOTES
Major Events:  none    Subjective:  awake    Temp:  [36.5 °C (97.7 °F)-37 °C (98.6 °F)] 36.5 °C (97.7 °F)  Heart Rate:  [54-91] 67  Resp:  [18-20] 20  BP: (113-134)/(62-83) 126/75     SpO2 Readings from Last 3 Encounters:   01/19/21 98%     Physical Exam:  Skin: No rash  Sclera: Anicteric  Lungs: rales  CV: S1, S2 nl, no embolic changes  Abd: Soft, nt, no hsm  Extr: No jt eff, no edema  Neuro: Alert, lucid    Lab Results   Component Value Date    WBC 8.20 01/19/2021    HGB 11.9 01/19/2021    HCT 34.7 (L) 01/19/2021    MCV 88.5 01/19/2021     (H) 01/19/2021     Lab Results   Component Value Date    GLUCOSE 100 (H) 01/19/2021    CALCIUM 8.6 (L) 01/19/2021     01/19/2021    K 3.7 01/19/2021    CO2 26 01/19/2021     01/19/2021    BUN 19 01/19/2021    CREATININE 0.8 01/19/2021     Lab Results   Component Value Date    ALBUMIN 3.3 (L) 01/19/2021    BILITOT 0.5 01/19/2021    ALKPHOS 59 01/19/2021    AST 36 01/19/2021    ALT 63 (H) 01/19/2021    PROTEIN 5.7 (L) 01/19/2021     Impression    COVID  RA sat nl    Call c questions    JOSE FRANCISCO De Los Santos MD  Pager [phone]

## 2021-01-19 NOTE — PLAN OF CARE
Plan of Care Review  Plan of Care Reviewed With: patient  Progress: improving  Outcome Summary: Patient slept comfortably for much of the night. Vitals stable on RA. Patient refusing alarms. Will continue ot monitor.

## 2021-01-19 NOTE — PATIENT CARE CONFERENCE
Care Progression Rounds Note  Date: 1/19/2021  Time: 10:11 AM     Patient Name: Toma Matson     Medical Record Number: [id number]   YOB: 1970  Sex: Female      Room/Bed: 4029    Admitting Diagnosis: Shortness of breath [R06.02]  Hypokalemia [E87.6]  Hypoxia [R09.02]  COVID-19 [U07.1]   Admit Date/Time: 1/14/2021  7:07 AM    Primary Diagnosis: No Principal Problem: There is no principal problem currently on the Problem List. Please update the Problem List and refresh.  Principal Problem: No Principal Problem: There is no principal problem currently on the Problem List. Please update the Problem List and refresh.    GMLOS: pending  Anticipated Discharge Date: 1/19/2021    AM-PAC  Mobility Score:      Discharge Planning:  Anticipated Discharge Disposition: home with home health services, home with assistance, home without services    Barriers to Discharge:  Barriers to Discharge: None  Comment: RA    Participants:  social work/services, nursing

## 2021-01-19 NOTE — PROGRESS NOTES
Patient: Toma LA Dano  Location: [facility] 4A 4029  MRN: [id number]  Today's date: 1/19/2021    Attempted to see patient for therapy. Unable due to  . Discussed with attending/ordering MD- pt independent in room, no indications of skilled OT needs at this time. OT to d/c orders; will require new consult if status changes.

## 2021-01-19 NOTE — HOSPITAL COURSE
Toma is a 50 y.o. female admitted on 1/14/2021 with COVID-19. Principal problem is No Principal Problem: There is no principal problem currently on the Problem List. Please update the Problem List and refresh..    Past Medical History  Toma has a past medical history of Hypertension.     History of Present Illness    Patient admitted to hospital with SOB; tested positive for COVID-19 on 1/7/21

## 2021-01-19 NOTE — PROGRESS NOTES
Hospital Medicine Service -  Daily Progress Note       SUBJECTIVE   Interval History: Toma Matson was seen and examined, no acute overnight event reported, patient reported no chest pain, sob, N/V/D, overt bleeding. Remain co cough, sob, on RA now, cont monitor and chaitanya decadron to 3 mg to benjamin        OBJECTIVE      Vital signs in last 24 hours:  Vitals:    01/19/21 0840   BP: 126/75   Pulse: 67   Resp: 20   Temp: 36.5 °C (97.7 °F)   SpO2: 98%       Intake/Output Summary (Last 24 hours) at 1/19/2021 1227  Last data filed at 1/18/2021 1600  Gross per 24 hour   Intake --   Output 1000 ml   Net -1000 ml       PHYSICAL EXAMINATION      General Appearance:  Awake, Alert, no distress     Head:  Normocephalic, without obvious abnormality, atraumatic   Neck: Supple      Lungs:   Clear to auscultation bilaterally  respirations unlabored  no rales  no wheezing     Heart:  Regular rhythm  S1 and S2 normal  no murmur, rub or gallop     Abdomen:   Soft, non-tender, no masses, no organomegaly     Vascular: Pulses 2+ and symmetric all extremities     Extremities: no calf tenderness      Behavior/Emotional: Appropriate, cooperative          LINES, CATHETERS, DRAINS, AIRWAYS, AND WOUNDS   Lines, Drains, Airways, Wounds:  Peripheral IV 01/14/21 Left Antecubital (Active)   Number of days: 5       Comments:      LABS / IMAGING / TELE      Labs  CMP Results       01/19/21 01/18/21 01/17/21                    0518 0601 0545          140 140         K 3.7 3.6 3.6         Cl 105 106 105         CO2 26 24 26         Glucose 100 105 107         BUN 19 17 15         Creatinine 0.8 0.7 0.6         Calcium 8.6 8.6 8.3         Anion Gap 8 10 9         AST 36 32 29         ALT 63 49 46         Albumin 3.3 3.2 3.2         EGFR >60.0 >60.0 >60.0                     CBC Results       01/19/21 01/18/21 01/17/21                    0518 0601 0545         WBC 8.20 7.38 7.11         RBC 3.92 3.84 3.97         HGB 11.9 11.5 11.7          HCT 34.7 34.4 35.5         MCV 88.5 89.6 89.4         MCH 30.4 29.9 29.5         MCHC 34.3 33.4 33.0          376 408                     Troponin I Results       01/14/21                          0754           Troponin I <0.02            <0.02                         PT/PTT Results       01/14/21                          0754           PT 12.3           INR 0.9           PTT 27           Comment for INR at 0754 on 01/14/21: INR has no defined significance when PT is within Reference Range.        Lab Results   Component Value Date    DDIMER 1.62 (H) 01/14/2021     Imaging  X-ray Chest 1 View    Result Date: 1/18/2021  IMPRESSION: No interval change.    X-ray Chest 1 View    Result Date: 1/14/2021  IMPRESSION:  Mild bibasilar opacity, possibly atelectasis related to poor inspiration.  Infiltrate is not excluded. COMMENT:  Portable chest x-ray was performed.  There are no prior studies for comparison.  The study is limited from a very poor inspiration.  Mild bibasilar opacities could represent atelectasis from poor inspiration.  Infiltrates are not excluded.  Cardiac size is normal.  There is no pleural effusion.       ECG/Telemetry  Tele and or EKG was Reviewed     ASSESSMENT AND PLAN        Hypokalemia  Assessment & Plan  Replace prn    Respiratory failure with hypoxia (CMS/HCC)  Assessment & Plan  Stable o2 requirement     1- and after     Remain on 2 L NC Pox resting 96%  On my exam, pt had dry cough, with minimal exertion, I saw pt cont pox down to 70s, and very slow recover, I have to increase to 4L NC and to facilitate Pox to be above 92%, then  I turn back to 2L NC   Cont support O2 via NC and  Close monitor     1-17  Remain on 2L nc with easy desat with minimal exertion or cough    COVID-19  Assessment & Plan  Patient with poss pneumonia 2/2 covid 19  CXR indeterminate  CoVid test: positive on 1/7     Continuous pulse ox - stable o2 requirement  trend inflammatory markers per covid  protocol  Continue bronchodilators q6hprn  Decadron - 6mg - Day 2  No Remdesivir   Continue supplemental O2 to maintain SpO2>90%  ID consulted    1- and after   On decadron day 3 (started on 1-)  Not on Remd  ID f/u   Remain feel sick, dry cough  Cont lovenox for DVT ppx   Add Protonix for GI ppx while on Steroid   Add Probiotics for supportive care     1-17/18/19  Still persist dry cough  Add mucinex   Cont decadron  Slow improving check Cxr              VTE Assessment: Padua    VTE Prophylaxis Plan: Continue current DVT Prophylaxis   Code Status: Full Code  Estimated Discharge Date: 1/19/2021  Disposition Planning: Pending progression    This patient note has been dictated using speech recognition software. Inadvertent speech recognition errors should be disregarded. [place] do not hesitate [place] [facility] [place].     Rigo Andrew MD  1/19/2021

## 2021-01-20 VITALS
BODY MASS INDEX: 30.39 KG/M2 | DIASTOLIC BLOOD PRESSURE: 71 MMHG | RESPIRATION RATE: 20 BRPM | TEMPERATURE: 98.1 F | HEIGHT: 65 IN | HEART RATE: 82 BPM | SYSTOLIC BLOOD PRESSURE: 117 MMHG | OXYGEN SATURATION: 98 % | WEIGHT: 182.4 LBS

## 2021-01-20 PROBLEM — J96.01 ACUTE RESPIRATORY FAILURE WITH HYPOXIA (CMS/HCC): Status: ACTIVE | Noted: 2021-01-14

## 2021-01-20 LAB
ALBUMIN SERPL-MCNC: 3.5 G/DL (ref 3.4–5)
ALP SERPL-CCNC: 59 IU/L (ref 35–126)
ALT SERPL-CCNC: 68 IU/L (ref 11–54)
ANION GAP SERPL CALC-SCNC: 9 MEQ/L (ref 3–15)
AST SERPL-CCNC: 35 IU/L (ref 15–41)
BASOPHILS # BLD: 0.02 K/UL (ref 0.01–0.1)
BASOPHILS NFR BLD: 0.2 %
BILIRUB SERPL-MCNC: 0.7 MG/DL (ref 0.3–1.2)
BUN SERPL-MCNC: 19 MG/DL (ref 8–20)
CALCIUM SERPL-MCNC: 9 MG/DL (ref 8.9–10.3)
CHLORIDE SERPL-SCNC: 102 MEQ/L (ref 98–109)
CK SERPL-CCNC: 40 U/L (ref 15–200)
CO2 SERPL-SCNC: 27 MEQ/L (ref 22–32)
CREAT SERPL-MCNC: 0.7 MG/DL (ref 0.6–1.1)
CRP SERPL-MCNC: 8.1 MG/L
DIFFERENTIAL METHOD BLD: ABNORMAL
EOSINOPHIL # BLD: 0.01 K/UL (ref 0.04–0.36)
EOSINOPHIL NFR BLD: 0.1 %
ERYTHROCYTE [DISTWIDTH] IN BLOOD BY AUTOMATED COUNT: 12.4 % (ref 11.7–14.4)
FERRITIN SERPL-MCNC: 410 NG/ML (ref 11–250)
GFR SERPL CREATININE-BSD FRML MDRD: >60 ML/MIN/1.73M*2
GLUCOSE SERPL-MCNC: 98 MG/DL (ref 70–99)
HCT VFR BLDCO AUTO: 36.9 % (ref 35–45)
HGB BLD-MCNC: 12.3 G/DL (ref 11.8–15.7)
IMM GRANULOCYTES # BLD AUTO: 0.4 K/UL (ref 0–0.08)
IMM GRANULOCYTES NFR BLD AUTO: 4.2 %
LDH SERPL L TO P-CCNC: 225 IU/L (ref 98–192)
LYMPHOCYTES # BLD: 1.46 K/UL (ref 1.2–3.5)
LYMPHOCYTES NFR BLD: 15.2 %
MAGNESIUM SERPL-MCNC: 2.2 MG/DL (ref 1.8–2.5)
MCH RBC QN AUTO: 30.1 PG (ref 28–33.2)
MCHC RBC AUTO-ENTMCNC: 33.3 G/DL (ref 32.2–35.5)
MCV RBC AUTO: 90.2 FL (ref 83–98)
MONOCYTES # BLD: 0.55 K/UL (ref 0.28–0.8)
MONOCYTES NFR BLD: 5.7 %
NEUTROPHILS # BLD: 7.19 K/UL (ref 1.7–7)
NEUTS SEG NFR BLD: 74.6 %
NRBC BLD-RTO: 0 %
PDW BLD AUTO: 9.1 FL (ref 9.4–12.3)
PHOSPHATE SERPL-MCNC: 3.8 MG/DL (ref 2.4–4.7)
PLATELET # BLD AUTO: 474 K/UL (ref 150–369)
POTASSIUM SERPL-SCNC: 4.1 MEQ/L (ref 3.6–5.1)
PROT SERPL-MCNC: 6.1 G/DL (ref 6–8.2)
RBC # BLD AUTO: 4.09 M/UL (ref 3.93–5.22)
SODIUM SERPL-SCNC: 138 MEQ/L (ref 136–144)
WBC # BLD AUTO: 9.63 K/UL (ref 3.8–10.5)

## 2021-01-20 PROCEDURE — 82550 ASSAY OF CK (CPK): CPT | Performed by: STUDENT IN AN ORGANIZED HEALTH CARE EDUCATION/TRAINING PROGRAM

## 2021-01-20 PROCEDURE — 85025 COMPLETE CBC W/AUTO DIFF WBC: CPT | Performed by: STUDENT IN AN ORGANIZED HEALTH CARE EDUCATION/TRAINING PROGRAM

## 2021-01-20 PROCEDURE — 83615 LACTATE (LD) (LDH) ENZYME: CPT | Performed by: STUDENT IN AN ORGANIZED HEALTH CARE EDUCATION/TRAINING PROGRAM

## 2021-01-20 PROCEDURE — 84100 ASSAY OF PHOSPHORUS: CPT | Performed by: HOSPITALIST

## 2021-01-20 PROCEDURE — 63700000 HC SELF-ADMINISTRABLE DRUG: Performed by: HOSPITALIST

## 2021-01-20 PROCEDURE — 82728 ASSAY OF FERRITIN: CPT | Performed by: STUDENT IN AN ORGANIZED HEALTH CARE EDUCATION/TRAINING PROGRAM

## 2021-01-20 PROCEDURE — 63700000 HC SELF-ADMINISTRABLE DRUG: Performed by: PHYSICIAN ASSISTANT

## 2021-01-20 PROCEDURE — 99239 HOSP IP/OBS DSCHRG MGMT >30: CPT | Mod: CR | Performed by: HOSPITALIST

## 2021-01-20 PROCEDURE — 83735 ASSAY OF MAGNESIUM: CPT | Performed by: HOSPITALIST

## 2021-01-20 PROCEDURE — 86140 C-REACTIVE PROTEIN: CPT | Performed by: STUDENT IN AN ORGANIZED HEALTH CARE EDUCATION/TRAINING PROGRAM

## 2021-01-20 PROCEDURE — 80053 COMPREHEN METABOLIC PANEL: CPT | Performed by: STUDENT IN AN ORGANIZED HEALTH CARE EDUCATION/TRAINING PROGRAM

## 2021-01-20 RX ORDER — CODEINE PHOSPHATE AND GUAIFENESIN 10; 100 MG/5ML; MG/5ML
5 SOLUTION ORAL EVERY 8 HOURS PRN
Qty: 50 ML | Refills: 0 | Status: SHIPPED | OUTPATIENT
Start: 2021-01-20 | End: 2021-01-23

## 2021-01-20 RX ORDER — PANTOPRAZOLE SODIUM 20 MG/1
20 TABLET, DELAYED RELEASE ORAL DAILY
Qty: 5 TABLET | Refills: 0 | Status: SHIPPED | OUTPATIENT
Start: 2021-01-21 | End: 2021-01-26

## 2021-01-20 RX ORDER — DEXAMETHASONE 1 MG/1
TABLET ORAL
Qty: 8 TABLET | Refills: 0 | Status: SHIPPED | OUTPATIENT
Start: 2021-01-20

## 2021-01-20 RX ORDER — ALBUTEROL SULFATE 90 UG/1
2 INHALANT RESPIRATORY (INHALATION) EVERY 6 HOURS PRN
Qty: 18 G | Refills: 1 | Status: SHIPPED | OUTPATIENT
Start: 2021-01-20 | End: 2021-02-19

## 2021-01-20 RX ORDER — DOCUSATE SODIUM 100 MG/1
100 CAPSULE, LIQUID FILLED ORAL 2 TIMES DAILY
Qty: 60 CAPSULE | Refills: 0 | Status: SHIPPED | OUTPATIENT
Start: 2021-01-20 | End: 2021-02-19

## 2021-01-20 RX ORDER — BENZONATATE 100 MG/1
100 CAPSULE ORAL 2 TIMES DAILY PRN
Qty: 14 CAPSULE | Refills: 0 | Status: CANCELLED | OUTPATIENT
Start: 2021-01-20

## 2021-01-20 RX ORDER — CODEINE PHOSPHATE AND GUAIFENESIN 10; 100 MG/5ML; MG/5ML
5 SOLUTION ORAL EVERY 8 HOURS PRN
Status: DISCONTINUED | OUTPATIENT
Start: 2021-01-20 | End: 2021-01-20 | Stop reason: HOSPADM

## 2021-01-20 RX ADMIN — GUAIFENESIN 200 MG: 100 SOLUTION ORAL at 08:13

## 2021-01-20 RX ADMIN — PROBIOTIC PRODUCT - TAB 1 TABLET: TAB at 08:11

## 2021-01-20 RX ADMIN — DEXAMETHASONE 3 MG: 6 TABLET ORAL at 08:28

## 2021-01-20 RX ADMIN — PANTOPRAZOLE SODIUM 20 MG: 20 TABLET, DELAYED RELEASE ORAL at 08:11

## 2021-01-20 NOTE — PATIENT CARE CONFERENCE
Care Progression Rounds Note  Date: 1/20/2021  Time: 10:22 AM     Patient Name: Toma Matson     Medical Record Number: [id number]   YOB: 1970  Sex: Female      Room/Bed: 4026    Admitting Diagnosis: Shortness of breath [R06.02]  Hypokalemia [E87.6]  Hypoxia [R09.02]  COVID-19 [U07.1]   Admit Date/Time: 1/14/2021  7:07 AM    Primary Diagnosis: No Principal Problem: There is no principal problem currently on the Problem List. Please update the Problem List and refresh.  Principal Problem: No Principal Problem: There is no principal problem currently on the Problem List. Please update the Problem List and refresh.    GMLOS: 5.4  Anticipated Discharge Date: 1/20/2021    AM-PAC  Mobility Score:      Discharge Planning:  Anticipated Discharge Disposition: home with home health services, home with assistance, home without services    Barriers to Discharge:  Barriers to Discharge: None  Comment: RA    Participants:  social work/services, , nursing

## 2021-01-20 NOTE — DISCHARGE SUMMARY
Hospital Medicine Service -  Inpatient Discharge Summary        BRIEF OVERVIEW   Admitting Provider: Rogelio Rios MD  Attending Provider: No att. providers found Attending phys phone: N/A    PCP: Iain Odonnell [phone]    Admission Date: 1/14/2021  Discharge Date: 1/20/2021     DISCHARGE DIAGNOSES      Primary Discharge Diagnosis  No Principal Problem: There is no principal problem currently on the Problem List. Please update the Problem List and refresh.    Secondary Discharge Diagnoses  Active Hospital Problems    Diagnosis Date Noted   • COVID-19 01/14/2021   • Acute respiratory failure with hypoxia (CMS/HCC) 01/14/2021   • Hypokalemia 01/14/2021      Resolved Hospital Problems   No resolved problems to display.       Problem List on Day of Discharge  Hypokalemia  Assessment & Plan  Replace prn    Acute respiratory failure with hypoxia (CMS/HCC)  Assessment & Plan  Stable o2 requirement     1- and after     Remain on 2 L NC Pox resting 96%  On my exam, pt had dry cough, with minimal exertion, I saw pt cont pox down to 70s, and very slow recover, I have to increase to 4L NC and to facilitate Pox to be above 92%, then  I turn back to 2L NC   Cont support O2 via NC and  Close monitor     1-17  Remain on 2L nc with easy desat with minimal exertion or cough    1-18/20  Off O2 for 48 Hrs  Pox around 97%    COVID-19  Assessment & Plan  Patient with poss pneumonia 2/2 covid 19  CXR indeterminate  CoVid test: positive on 1/7     Continuous pulse ox - stable o2 requirement  trend inflammatory markers per covid protocol  Continue bronchodilators q6hprn  Decadron - 6mg - Day 2  No Remdesivir   Continue supplemental O2 to maintain SpO2>90%  ID consulted    1- and after   On decadron day 3 (started on 1-)  Not on Remd  ID f/u   Remain feel sick, dry cough  Cont lovenox for DVT ppx   Add Protonix for GI ppx while on Steroid   Add Probiotics for supportive care     1-17/18/19  Still persist  dry cough  Add mucinex   Cont decadron  Slow improving check Cxr     1-20  Off O2 for 48 hours   Remain dry cough but better   Cxr no sig change   Taper Decadron  ID follow       SUMMARY OF HOSPITALIZATION      HPI per H/P   Toma Matson is a 50 y.o. female with a significant past medical history of hypertension presenting with a chief complaint of shortness of breath.  Patient reports onset of symptoms approximately 12 days ago.  Was tested for Covid on January 7 which resulted positive.  Patient reports that during this time she experienced fevers, chills, nausea, shortness of breath.  Cough that was nonproductive.  Muscle aches, unable to smell or taste.  Patient is ascending stairs today, felt dizzy, with further shortness of breath.  Due to unrelenting symptoms presented to the ED.  In the ED chest x-ray was limited study due to poor inspiration, mild bibasilar opacities noted possibly atelectasis from poor inspiration, infiltrates not excluded. Noted to be hypoxic to 90% in the ED started on 2 L. Was given 6 mg of Decadron in the ED.    Hospital Course    Patient was admitted for COVID-19 infection, infectious disease was consulted, patient was placed on Decadron, initially patient required 2 L nasal cannula, along with supportive care, Decadron, patient symptom seems improved remarkably, able to taper off oxygen, remain on room air for 2 days, Decadron and taper, patient clinically improved, on telemetry only 1 time 3 beat nonsustained VT, likely secondary to COVID-19 infection, to optimize electrolyte, outpatient follow-up with his primary care physician, patient was seen exam on the day of discharge, no acute overnight event reported, patient reported no chest pain, sob, N/V/D, overt bleeding.   I personally reviewed patient chart, imaging study, lab study, I personally discussed case with patient's nursing staff, , case management, I personally organize patient discharge instruction  and medication reconciliation.  Patient vital signs stable, labs unremarkable, patient medically ready and stable for discharge.    Vitals:    01/20/21 0531 01/20/21 0727 01/20/21 0925 01/20/21 1416   BP:   137/82 117/71   BP Location:   Right upper arm Right upper arm   Patient Position:   Lying Lying   Pulse: (!) 56 (!) 56 69 82   Resp:   20 20   Temp:   36.6 °C (97.8 °F) 36.7 °C (98.1 °F)   TempSrc:   Oral Oral   SpO2:   98% 98%   Weight:       Height:           Exam on Day of Discharge  General Appearance:  Awake, Alert, no distress     Head:  Normocephalic, without obvious abnormality, atraumatic   Neck: Supple      Lungs:   Clear to auscultation bilaterally  respirations unlabored  no rales  no wheezing     Heart:  Regular rhythm  S1 and S2 normal  no murmur, rub or gallop     Abdomen:   Soft, non-tender, no masses, no organomegaly     Vascular: Pulses 2+ and symmetric all extremities     Extremities: no calf tenderness      Behavior/Emotional: Appropriate, cooperative     Consults During Admission  IP CONSULT TO INFECTIOUS DISEASE    DISCHARGE MEDICATIONS        Medication List      START taking these medications    albuterol HFA 90 mcg/actuation inhaler  Commonly known as: VENTOLIN HFA  Inhale 2 puffs every 6 (six) hours as needed for wheezing or shortness of breath.  Dose: 2 puff     codeine-guaifenesin  mg/5 mL liquid  Commonly known as: ROBITUSSIN-AC  Take 5 mL by mouth every 8 (eight) hours as needed for cough for up to 3 days.  Dose: 5 mL     dexAMETHasone 1 mg tablet  Commonly known as: DECADRON  Take 3 mg PO daily for 2 days then take 1 mg po daily for 2 days     docusate sodium 100 mg capsule  Commonly known as: COLACE  Take 1 capsule (100 mg total) by mouth 2 (two) times a day.  Dose: 100 mg     lactobacillus acidophilus complex 1 billion cell- 250 mg tablet  Commonly known as: BACID  Take 1 tablet by mouth 2 (two) times a day.  Dose: 1 tablet     pantoprazole 20 mg EC tablet  Commonly known  as: PROTONIX  Start taking on: January 21, 2021  Take 1 tablet (20 mg total) by mouth daily for 5 days Indications: stress ulcer prevention.  Dose: 20 mg        CONTINUE taking these medications    amLODIPine 5 mg tablet  Commonly known as: NORVASC  Take 5 mg by mouth daily.  Dose: 5 mg     fluticasone propionate 50 mcg/actuation nasal spray  Commonly known as: FLONASE  Administer 1 spray into each nostril daily as needed for rhinitis.  Dose: 1 spray                 PROCEDURES / LABS / IMAGING      Operative Procedures  [unfilled]    Pertinent Labs  CMP Results       01/20/21 01/19/21 01/18/21                    0525 0518 0601          139 140         K 4.1 3.7 3.6         Cl 102 105 106         CO2 27 26 24         Glucose 98 100 105         BUN 19 19 17         Creatinine 0.7 0.8 0.7         Calcium 9.0 8.6 8.6         Anion Gap 9 8 10         AST 35 36 32         ALT 68 63 49         Albumin 3.5 3.3 3.2         EGFR >60.0 >60.0 >60.0                     CBC Results       01/20/21 01/19/21 01/18/21                    0525 0518 0601         WBC 9.63 8.20 7.38         RBC 4.09 3.92 3.84         HGB 12.3 11.9 11.5         HCT 36.9 34.7 34.4         MCV 90.2 88.5 89.6         MCH 30.1 30.4 29.9         MCHC 33.3 34.3 33.4          408 376                     Troponin I Results       01/14/21                          0754           Troponin I <0.02            <0.02                         PT/PTT Results       01/14/21                          0754           PT 12.3           INR 0.9           PTT 27           Comment for INR at 0754 on 01/14/21: INR has no defined significance when PT is within Reference Range.        Lab Results   Component Value Date    DDIMER 1.62 (H) 01/14/2021       Pertinent Imaging  X-ray Chest 1 View    Result Date: 1/18/2021  IMPRESSION: No interval change.    X-ray Chest 1 View    Result Date: 1/14/2021  IMPRESSION:  Mild bibasilar opacity, possibly atelectasis related to poor  inspiration.  Infiltrate is not excluded. COMMENT:  Portable chest x-ray was performed.  There are no prior studies for comparison.  The study is limited from a very poor inspiration.  Mild bibasilar opacities could represent atelectasis from poor inspiration.  Infiltrates are not excluded.  Cardiac size is normal.  There is no pleural effusion.       OUTPATIENT  FOLLOW-UP / REFERRALS / PENDING TESTS      Important Issues to Address in Follow-Up  Please follow up with PCP in One week       DISCHARGE DISPOSITION      Disposition: Home     This patient note has been dictated using speech recognition software. Inadvertent speech recognition errors should be disregarded. Please do not hesitate to [place] [facility] [place]ifications.

## 2021-01-20 NOTE — DISCHARGE INSTRUCTIONS
Please follow-up with your primary care physician in 1 week after discharge from hospital    While you were hospitalized you were tested for COVID-19/Coronavirus and the results were positive (meaning you do/did have COVID-19/Coronavirus).  The Health Department will be in touch with you.  If they do not call, please contact them using the appropriate phone number below.     After discharge the [facility] requires you to remain in quarantine (away from others) for at least 7 days after your symptoms started PLUS at least 3 days (72 hours) have passed since you were better (meaning no fever AND improvement in respiratory symptoms (e.g., cough, shortness of breath).  If you are a healthcare provider please ensure you check with the Department of Health before removing yourself from quarantine.     The [place] Centers for Disease Control provides us with instructions of how you should be quarantined.  These instructions can also be found at: https://www.cdc.gov/coronavirus/2019-ncov/downloads/sick-with-2019-nCoV-fact-sheet.pdf     • Stay home except to get medical care. You should restrict activities outside your home, except for getting medical care. Do not go to work, school, or public areas. Avoid using public transportation, ride-sharing, or taxis.  • Separate yourself from other people and animals in your home. People: As much as possible, you should stay in a specific room and away from other people in your home. Also, you should use a separate bathroom, if available. Animals: Do not handle pets or other animals while sick.  • Call ahead before visiting your doctor. If you have a medical appointment, call the healthcare provider and tell them that you have or may have COVID-19. This will help the healthcare provider's office take steps to keep other people from getting infected or exposed.  • Wear a facemask: You should wear a facemask when you are around other people (e.g., sharing a room or  vehicle) or pets and before you enter a healthcare provider's office. If you are not able to wear a facemask (for example, because it causes trouble breathing), then people who live with you should not stay in the same room with you, or they should wear a facemask if they enter your room.  • Cover your coughs and sneezes. Cover your mouth and nose with a tissue when you cough or sneeze. Throw used tissues in a lined trash can; immediately wash your hands with soap and water for at least 20 seconds or clean your hands with an alcohol-based hand  that contains at least 60-95% alcohol covering all surfaces of your hands and rubbing them together until they feel dry. Soap and water should be used preferentially if hands are visibly dirty  • Avoid sharing personal household items. You should not share dishes, drinking glasses, cups, eating utensils, towels, or bedding with other people or pets in your home. After using these items, they should be washed thoroughly with soap and water.  • Clean your hands often. Wash your hands often with soap and water for at least 20 seconds. If soap and water are not available, clean your hands with an alcohol-based hand  that contains at least 60% alcohol, covering all surfaces of your hands and rubbing them together until they feel dry. Soap and water should be used preferentially if hands are visibly dirty. Avoid touching your eyes, nose, and mouth with unwashed hands.  • Clean all ?igh-touch” surfaces every day. High touch surfaces include counters, tabletops, doorknobs, bathroom fixtures, toilets, phones, keyboards, tablets, and bedside tables. Also, clean any surfaces that may have blood, stool, or body fluids on them. Use a household cleaning spray or wipe, according to the label instructions. Labels contain instructions for safe and effective use of the cleaning product including precautions you should take when applying the product, such as wearing gloves and  making sure you have good ventilation during use of the product.  • Monitor your symptoms. Seek prompt medical attention if your illness is worsening (e.g., difficulty breathing). Before seeking care, call your healthcare provider and tell them that you have, or are being evaluated for, COVID-19. Put on a facemask before you enter the facility. These steps will help the healthcare provider's office to keep other people in the office or waiting room from getting infected or exposed.  • If you have a medical emergency and need to call 911, notify the dispatch personnel that you have, or are being evaluated for COVID-19. If possible, put on a facemask before emergency medical services arrive.        If you notice any worsening shortness of breath, light-headedness, confusion, extreme fatigue please call you doctor or return to the hospital immediately.      For further information or if you are not called by the Health Department:     For All [place] Residents  [place] Department of Health Information webpage: https://[email or web address].[place].gov/providers/Providers/Pages/Coronavirus-2020.aspx  [place] Department of Health COVID-19 phone number: 2[facility] ([phone])     For All [facility] webpage: https://www.[facility].gov/services/mental-physical-health/environmental-health-hazards/covid-19/  [facility] phone number: 1-506.911.4476     For All [facility] Residents  [facility] webpage: [email or web address]  [facility] phone number: [phone]     For Residents of [facility] Information webpage: https://www.[facility].[place].gov/providers/Providers/Pages/Coronavirus-2020.aspx  [place] Department of Health COVID-19 phone number: [facility] ([phone])     For all [facility]  Residents  [facility] webpage: [email or web address]  [facility] phone number: 1-717.897.4036     [facility] Center for Disease Control (CDC) COVID-19 Information Webpage: https://www.cdc.gov/coronavirus/2019-nCoV/index.html

## 2021-10-25 ENCOUNTER — OFFICE VISIT (OUTPATIENT)
Dept: NEUROSURGERY | Facility: CLINIC | Age: 51
End: 2021-10-25
Payer: COMMERCIAL

## 2021-10-25 VITALS
HEART RATE: 83 BPM | WEIGHT: 194 LBS | DIASTOLIC BLOOD PRESSURE: 80 MMHG | OXYGEN SATURATION: 98 % | HEIGHT: 65 IN | SYSTOLIC BLOOD PRESSURE: 124 MMHG | BODY MASS INDEX: 32.32 KG/M2

## 2021-10-25 DIAGNOSIS — M53.3 SACROILIAC DYSFUNCTION: Primary | ICD-10-CM

## 2021-10-25 PROCEDURE — 3008F BODY MASS INDEX DOCD: CPT | Performed by: NEUROLOGICAL SURGERY

## 2021-10-25 PROCEDURE — 99204 OFFICE O/P NEW MOD 45 MIN: CPT | Performed by: NEUROLOGICAL SURGERY

## 2021-10-25 RX ORDER — MECLIZINE HCL 12.5 MG 12.5 MG/1
12.5 TABLET ORAL
COMMUNITY
Start: 2021-09-27 | End: 2025-02-21

## 2021-10-25 RX ORDER — IBUPROFEN 800 MG/1
TABLET ORAL
COMMUNITY
Start: 2021-10-07 | End: 2025-02-21

## 2021-10-25 RX ORDER — ATORVASTATIN CALCIUM 20 MG/1
TABLET, FILM COATED ORAL
COMMUNITY
Start: 2021-10-08 | End: 2025-02-21

## 2021-10-25 ASSESSMENT — PAIN SCALES - GENERAL: PAINLEVEL: 10-WORST PAIN EVER

## 2021-10-25 NOTE — PROGRESS NOTES
[facility]  Medical Office [address], [address]  [place] [address]  Phone: [phone]  Fax: [phone]      10/25/21    Re: Toma Matson  : 1970    Referring Physician:   Dr. Piña (Pain Medicine)    Chief Complaint:   Lumbosacral discomfort    History of Present Illness:   Toma Matson is a 51 y.o. female who presents in neurosurgical consultation regarding right-sided lumbosacral discomfort.  Her symptoms have been present for greater than 10 years.  She sustained a fall several years prior which may have aggravated her symptoms.  She sought medical evaluation in the past and was referred for several modalities of treatment includes a physical therapy, medication management, injection based procedures with only transient improvement in her symptoms.  She was thereafter referred to my attention in secondary consultation.      On interview today, she notes ongoing right greater than left lumbosacral discomfort.  Her pain vacillates in severity between 8-10 at 10.  Postural activities greatly magnify her symptoms, whereas rest in the recumbent position helps to a degree.  She has intermittent associated features of pain extending into her left thigh.  There is no extension distally past her knee.  She feels subjectively weaker in both lower extremities.  She has poor tolerance for upright activities.  She however ambulates without the need for an assistive device.  She denies additional features of philippe numbness, bowel, bladder dysfunction. She reports a severe level of impairment of her activities of daily living due to the aforementioned symptoms.     Medical History:  has a past medical history of Hypercholesteremia.    Surgical History:  has a past surgical history that includes Other surgical history.    Family History: Family history non-contributory to neurological condition.    Social History:   Social History      Socioeconomic History   • Marital status: Single     Spouse name: None   • Number of children: None   • Years of education: None   • Highest education level: None   Occupational History   • None   Tobacco Use   • Smoking status: Never Smoker   • Smokeless tobacco: Never Used   Substance and Sexual Activity   • Alcohol use: Yes   • Drug use: Never   • Sexual activity: None   Other Topics Concern   • None   Social History Narrative   • None     Social Determinants of Health     Financial Resource Strain:    • Difficulty of Paying Living Expenses: Not on file   Food Insecurity:    • Worried About Running Out of Food in the Last Year: Not on file   • Ran Out of Food in the Last Year: Not on file   Transportation Needs:    • Lack of Transportation (Medical): Not on file   • Lack of Transportation (Non-Medical): Not on file   Physical Activity:    • Days of Exercise per Week: Not on file   • Minutes of Exercise per Session: Not on file   Stress:    • Feeling of Stress : Not on file   Social Connections:    • Frequency of Communication with Friends and Family: Not on file   • Frequency of Social Gatherings with Friends and Family: Not on file   • Attends Judaism Services: Not on file   • Active Member of Clubs or Organizations: Not on file   • Attends Club or Organization Meetings: Not on file   • Marital Status: Not on file   Intimate Partner Violence:    • Fear of Current or Ex-Partner: Not on file   • Emotionally Abused: Not on file   • Physically Abused: Not on file   • Sexually Abused: Not on file   Housing Stability:    • Unable to Pay for Housing in the Last Year: Not on file   • Number of Places Lived in the Last Year: Not on file   • Unstable Housing in the Last Year: Not on file        Allergies:   Allergies   Allergen Reactions   • Ciprofloxacin        Medications:   Current Outpatient Medications   Medication Sig Dispense Refill   • meclizine 12.5 mg tablet 12.5 mg.     • albuterol HFA (VENTOLIN HFA) 90  "mcg/actuation inhaler Inhale 2 puffs every 6 (six) hours as needed for wheezing or shortness of breath. 18 g 1   • amLODIPine (NORVASC) 5 mg tablet Take 5 mg by mouth daily.       • atorvastatin 20 mg tablet      • dexAMETHasone (DECADRON) 1 mg tablet Take 3 mg PO daily for 2 days then take 1 mg po daily for 2 days 8 tablet 0   • docusate sodium (COLACE) 100 mg capsule Take 1 capsule (100 mg total) by mouth 2 (two) times a day. 60 capsule 0   • fluticasone propionate (FLONASE) 50 mcg/actuation nasal spray Administer 1 spray into each nostril daily as needed for rhinitis.     • ibuprofen 800 mg tablet      • lactobacillus acidophilus complex (BACID) 1 billion cell- 250 mg tablet Take 1 tablet by mouth 2 (two) times a day. 60 tablet 0   • pantoprazole (PROTONIX) 20 mg EC tablet Take 1 tablet (20 mg total) by mouth daily for 5 days Indications: stress ulcer prevention. 5 tablet 0     No current facility-administered medications for this visit.       Review of Systems:   A 14 point review of systems was performed and aside from what is mentioned above is otherwise negative.    General physical examination:  The patient is well appearing female, sitting upright in her chair in no acute distress, she appears her stated age.  Her head is atraumatic, normocephalic. Her neck is supple without obvious adenopathy. Oral mucosa is moist. Her peripheral pulses are symmetric and palpable. Extremities without peripheral edema. Her breathing is normal and unlabored.     Vitals:    10/25/21 1521   BP: 124/80   Pulse: 83   SpO2: 98%   Weight: 88 kg (194 lb)   Height: 1.651 m (5' 5\")        Neurologic examination:  Mental status:  The patient is alert, attentive, and oriented. Speech is clear and fluent with good repetition, comprehension, and naming.  Remote and recent memory are normal as well as fund of knowledge.    Cranial nerves:  CN II: Visual fields are full to confrontation.  Pupils are equal and briskly reactive to light. "   CN III, IV, VI: Extra-occular muscles are intact  CN V: Facial sensation is intact and equal in V1-V3 distributions bilaterally.   CN VII: Face is symmetric with normal eye closure and smile.  CN VIII: Hearing is normal to rubbing fingers  CN IX, X: Palate elevates symmetrically. Phonation is normal.  CN XI: Head turning and shoulder shrug are intact  CN XII: Tongue is midline with normal movements and no atrophy.    Motor:  There is no pronator drift.  Muscle bulk and tone are normal.    Deltoid Biceps Triceps Wrist ext Hand  Finger Spread Hip flexion Knee ext Dorsi-  flexion EHL Plantar Flexion   L 5 5 5 5 5 5 5 5 5 5 5   R 5 5 5 5 5 5 5 5 5 5 5     Reflexes:  Reflexes are 2+ and symmetric at the biceps, brachioradialis, triceps, patellar, and achilles. There is no Mratinez's sign or ankle clonus.    Sensory:   Intact to light touch throughout all 4 extremities.    Coordination:  There is no dysmetria on finger-to-nose testing.  Romberg is absent.    Gait:  Gait is antalgic with short stride, wide based gait.    Musculoskeletal:   TTP right PSIS  Difficulty stepping onto stepstool on both legs.   Positive thigh thrust bilaterally  Positive compression on the right, negative compression on the left     Data Review:  MRI lumbar spine with and without contrast, 3/23/20  Impression: Progressive lumbar spondylosis at L3-4 with new grade 1 anterolisthesis and moderate spinal canal stenosis. Mild to moderate reight greater than left neural foraminal narrowing at this level is also progressed.     New left paracentral/foraminal annular fissure at L4-5. Stable moderate to severe spinal canal stenosis and mild to moderate neural foraminal narrowing at this level.     Stable lumbar spondylosis as described above otherwise. Enhancement of the facet joints, worse at L3-4 and L4-5, likely degenerative and compatible with active inflammation.     Increase in size of the 1.5cm left renal cyst with dependent blood products or  debris.      X-ray lumbar spine complete, 7/6/2020  PROCEDURE: XR LUMBAR SPINE 4 VWS OR MORE   TECHNIQUE: Seven views     PROVIDED INDICATION:   ADDITIONAL INFORMATION: Pain     COMPARISON: None     FINDINGS:  Vertebral bodies are normal in height. There is   minimal narrowing of the L5-S1 disc space. Facet joint degenerative  changes are seen. There is no evidence of a spondylolisthesis or  spondylolysis. No significant subluxation is seen during flexion or  extension.    IMPRESSION:   Facet joint degenerative changes.    Images were personally reviewed by myself and with the patient.    Assessment and Plan:   In summary, Toma Matson is a 51 y.o. female with ongoing features of lumbosacral discomfort that is eccentric to the right.  On physical examination, she has features of tenderness to palpation of the right sacroiliac joint.  She has several provocative maneuvers which are positive for likely sacroiliac joint dysfunction.  MR imaging of the lumbosacral spine discloses multilevel degenerative disc disease without associated high-grade stenosis affecting the lumbosacral nerve roots.  We recommend she undergo a diagnostic right sacroiliac joint injection prior to proceeding with a trial of spinal cord stimulation.     The patient was counseled at length regarding the natural history of degenerative lumbar stenosis, disc disease, radiculopathy, sacroiliac joint dysfunction. I have encouraged she continue with lifestyle modification, symptomatic medication, avoidance of excessive bending, twisting, overhead lifting, unrestrained forces across her axial skeleton.  I look forward to seeing return follow-up consultation after she has undergone the aforementioned injection. In the interim should her symptomatology evolve or worsen, I have asked she return sooner for prompt reevaluation.    Thank you for referring Toma Matson to my attention. Please feel free to contact me anytime if I can be of  further assistance.        I, Byron Beckham PA-C, am scribing for, and in the presence of, Spencer Jackson MD.    I, Spencer Jackson MD, personally performed the services described in this documentation as scribed by Byron Beckham PA-C in my presence, and it is accurate and complete.     I spent 45 minutes on this date of service performing the following activities: obtaining history, performing examination, entering orders, documenting, preparing for visit, obtaining / reviewing records, providing counseling and education, independently reviewing study/studies, communicating results and coordinating care.

## 2023-12-27 ENCOUNTER — APPOINTMENT (RX ONLY)
Dept: URBAN - METROPOLITAN AREA CLINIC 374 | Facility: CLINIC | Age: 53
Setting detail: DERMATOLOGY
End: 2023-12-27

## 2023-12-27 DIAGNOSIS — L91.8 OTHER HYPERTROPHIC DISORDERS OF THE SKIN: ICD-10-CM

## 2023-12-27 PROCEDURE — 11200 RMVL SKIN TAGS UP TO&INC 15: CPT

## 2023-12-27 PROCEDURE — ? COUNSELING

## 2023-12-27 PROCEDURE — ? SKIN TAG REMOVAL MULTI

## 2023-12-27 ASSESSMENT — LOCATION DETAILED DESCRIPTION DERM
LOCATION DETAILED: LEFT SUPERIOR LATERAL NECK
LOCATION DETAILED: RIGHT INFERIOR LATERAL NECK
LOCATION DETAILED: LEFT INFERIOR ANTERIOR NECK
LOCATION DETAILED: RIGHT SUPERIOR ANTERIOR NECK
LOCATION DETAILED: LEFT INFERIOR LATERAL NECK
LOCATION DETAILED: LEFT MEDIAL TRAPEZIAL NECK
LOCATION DETAILED: LEFT INFERIOR POSTERIOR NECK
LOCATION DETAILED: MID POSTERIOR NECK

## 2023-12-27 ASSESSMENT — LOCATION SIMPLE DESCRIPTION DERM
LOCATION SIMPLE: POSTERIOR NECK
LOCATION SIMPLE: LEFT ANTERIOR NECK
LOCATION SIMPLE: RIGHT ANTERIOR NECK

## 2023-12-27 ASSESSMENT — LOCATION ZONE DERM: LOCATION ZONE: NECK

## 2023-12-27 NOTE — PROCEDURE: SKIN TAG REMOVAL MULTI
Include Z78.9 (Other Specified Conditions Influencing Health Status) As An Associated Diagnosis?: No
Add Associated Diagnoses If Applicable When Selecting Medical Necessity: Yes
Medical Necessity Clause: This procedure was medically necessary because the lesions that were treated were:
Detail Level: Zone
Hemostasis: Hot cautery
Total Number Of Lesions Treated: 1
Medical Necessity Information: It is in your best interest to select a reason for this procedure from the list below. All of these items fulfill various CMS LCD requirements except the new and changing color options.
Consent: Written consent obtained and the risks of skin tag removal was reviewed with the patient including but not limited to bleeding, pigmentary change, infection, pain, and remote possibility of scarring.

## 2025-02-21 ENCOUNTER — APPOINTMENT (EMERGENCY)
Dept: RADIOLOGY | Facility: HOSPITAL | Age: 55
End: 2025-02-21
Payer: COMMERCIAL

## 2025-02-21 ENCOUNTER — HOSPITAL ENCOUNTER (EMERGENCY)
Facility: HOSPITAL | Age: 55
Discharge: HOME | End: 2025-02-21
Attending: EMERGENCY MEDICINE | Admitting: EMERGENCY MEDICINE
Payer: COMMERCIAL

## 2025-02-21 VITALS
HEART RATE: 71 BPM | DIASTOLIC BLOOD PRESSURE: 92 MMHG | OXYGEN SATURATION: 99 % | WEIGHT: 202 LBS | BODY MASS INDEX: 33.66 KG/M2 | SYSTOLIC BLOOD PRESSURE: 153 MMHG | TEMPERATURE: 97.1 F | HEIGHT: 65 IN | RESPIRATION RATE: 18 BRPM

## 2025-02-21 DIAGNOSIS — S40.022A CONTUSION OF LEFT UPPER EXTREMITY, INITIAL ENCOUNTER: Primary | ICD-10-CM

## 2025-02-21 LAB
ALBUMIN SERPL-MCNC: 4.5 G/DL (ref 3.5–5.7)
ALP SERPL-CCNC: 51 IU/L (ref 34–125)
ALT SERPL-CCNC: 18 IU/L (ref 7–52)
ANION GAP SERPL CALC-SCNC: 6 MEQ/L (ref 3–15)
AST SERPL-CCNC: 21 IU/L (ref 13–39)
BASOPHILS # BLD: 0.05 K/UL (ref 0.01–0.1)
BASOPHILS NFR BLD: 0.9 %
BILIRUB SERPL-MCNC: 1.5 MG/DL (ref 0.3–1.2)
BUN SERPL-MCNC: 14 MG/DL (ref 7–25)
CALCIUM SERPL-MCNC: 9.7 MG/DL (ref 8.6–10.3)
CHLORIDE SERPL-SCNC: 105 MEQ/L (ref 98–107)
CO2 SERPL-SCNC: 28 MEQ/L (ref 21–31)
CREAT SERPL-MCNC: 0.9 MG/DL (ref 0.6–1.2)
DIFFERENTIAL METHOD BLD: ABNORMAL
EGFRCR SERPLBLD CKD-EPI 2021: >60 ML/MIN/1.73M*2
EOSINOPHIL # BLD: 0.13 K/UL (ref 0.04–0.36)
EOSINOPHIL NFR BLD: 2.2 %
ERYTHROCYTE [DISTWIDTH] IN BLOOD BY AUTOMATED COUNT: 12.4 % (ref 11.7–14.4)
GLUCOSE SERPL-MCNC: 92 MG/DL (ref 70–99)
HCT VFR BLD AUTO: 39.4 % (ref 35–45)
HGB BLD-MCNC: 13.8 G/DL (ref 11.8–15.7)
IMM GRANULOCYTES # BLD AUTO: 0.01 K/UL (ref 0–0.08)
IMM GRANULOCYTES NFR BLD AUTO: 0.2 %
LYMPHOCYTES # BLD: 1.83 K/UL (ref 1.2–3.5)
LYMPHOCYTES NFR BLD: 31.4 %
MCH RBC QN AUTO: 31 PG (ref 28–33.2)
MCHC RBC AUTO-ENTMCNC: 35 G/DL (ref 32.2–35.5)
MCV RBC AUTO: 88.5 FL (ref 83–98)
MONOCYTES # BLD: 0.29 K/UL (ref 0.28–0.8)
MONOCYTES NFR BLD: 5 %
NEUTROPHILS # BLD: 3.51 K/UL (ref 1.7–7)
NEUTS SEG NFR BLD: 60.3 %
NRBC BLD-RTO: 0 %
PLATELET # BLD AUTO: 313 K/UL (ref 150–369)
PMV BLD AUTO: 8.8 FL (ref 9.4–12.3)
POTASSIUM SERPL-SCNC: 3.4 MEQ/L (ref 3.5–5.1)
PROT SERPL-MCNC: 6.9 G/DL (ref 6–8.2)
RBC # BLD AUTO: 4.45 M/UL (ref 3.93–5.22)
SODIUM SERPL-SCNC: 139 MEQ/L (ref 136–145)
WBC # BLD AUTO: 5.82 K/UL (ref 3.8–10.5)

## 2025-02-21 PROCEDURE — 93971 EXTREMITY STUDY: CPT | Mod: LT

## 2025-02-21 PROCEDURE — 85025 COMPLETE CBC W/AUTO DIFF WBC: CPT

## 2025-02-21 PROCEDURE — 85025 COMPLETE CBC W/AUTO DIFF WBC: CPT | Performed by: EMERGENCY MEDICINE

## 2025-02-21 PROCEDURE — 80053 COMPREHEN METABOLIC PANEL: CPT

## 2025-02-21 PROCEDURE — 99284 EMERGENCY DEPT VISIT MOD MDM: CPT | Mod: 25

## 2025-02-21 PROCEDURE — 36415 COLL VENOUS BLD VENIPUNCTURE: CPT

## 2025-02-21 PROCEDURE — 80053 COMPREHEN METABOLIC PANEL: CPT | Performed by: EMERGENCY MEDICINE

## 2025-02-21 RX ORDER — CYCLOBENZAPRINE HCL 5 MG
5 TABLET ORAL 3 TIMES DAILY PRN
COMMUNITY

## 2025-02-21 RX ORDER — PREGABALIN 50 MG/1
50 CAPSULE ORAL 2 TIMES DAILY
COMMUNITY

## 2025-02-21 RX ORDER — MELOXICAM 7.5 MG/1
7.5 TABLET ORAL DAILY
COMMUNITY

## 2025-02-21 ASSESSMENT — ENCOUNTER SYMPTOMS
SHORTNESS OF BREATH: 0
CHEST TIGHTNESS: 0
FATIGUE: 0
COUGH: 0

## 2025-02-22 NOTE — ED PROVIDER NOTES
Emergency Medicine Note  HPI   HISTORY OF PRESENT ILLNESS     Patient is a 54-year-old female with no reported significant past medical history who now presents to the emergency department with bruising of her left arm.  Patient stating that earlier this week she donated plasma and that the person did have difficulty finding her vein.  She states that she has been having some bruising and discomfort since voices concern for DVT.      History provided by:  Patient   used: No          Patient History   PAST HISTORY     Reviewed from Nursing Triage:       Past Medical History:   Diagnosis Date    Hypercholesteremia        Past Surgical History   Procedure Laterality Date    Other surgical history      removal of cyst wrist , partial hysterectomy deviate spetum tummy tuck  arthroscopy both knees       No family history on file.    Social History     Tobacco Use    Smoking status: Never    Smokeless tobacco: Never   Substance Use Topics    Alcohol use: Yes    Drug use: Never         Review of Systems   REVIEW OF SYSTEMS     Review of Systems   Constitutional:  Negative for fatigue.   Respiratory:  Negative for cough, chest tightness and shortness of breath.          VITALS     ED Vitals      Date/Time Temp Pulse Resp BP SpO2 [facility]   02/21/25 1623 36.2 °C (97.1 °F) 71 18 153/92 99 % CM          Pulse Ox %: 99 % (02/21/25 1942)  Pulse Ox Interpretation: Normal (02/21/25 1942)           Physical Exam   PHYSICAL EXAM     Physical Exam  Constitutional:       General: She is not in acute distress.     Appearance: She is not ill-appearing or toxic-appearing.   HENT:      Head: Atraumatic.   Cardiovascular:      Rate and Rhythm: Normal rate and regular rhythm.      Pulses:           Radial pulses are 2+ on the left side.   Pulmonary:      Effort: No respiratory distress.   Musculoskeletal:      Comments: Left AC proximal forearm with ecchymosis, no swelling or edema.   Skin:     General: Skin is warm and dry.    Neurological:      General: No focal deficit present.      Mental Status: She is alert and oriented to person, place, and time.           PROCEDURES     Procedures     DATA     Results       Procedure Component Value Units Date/Time    Comprehensive metabolic panel [[id number]]  (Abnormal) Collected: 02/21/25 1635    Specimen: Blood, Venous Updated: 02/21/25 1719     Sodium 139 mEQ/L      Potassium 3.4 mEQ/L      Comment: Results obtained on plasma. Plasma Potassium values may be up to 0.4 mEQ/L less than serum values. The differences may be greater for patients with high platelet or white cell counts.        Chloride 105 mEQ/L      CO2 28 mEQ/L      BUN 14 mg/dL      Creatinine 0.9 mg/dL      Glucose 92 mg/dL      Calcium 9.7 mg/dL      AST (SGOT) 21 IU/L      ALT (SGPT) 18 IU/L      Alkaline Phosphatase 51 IU/L      Total Protein 6.9 g/dL      Comment: Test performed on plasma which typically contains approximately 0.4 g/dL more protein than serum.        Albumin 4.5 g/dL      Bilirubin, Total 1.5 mg/dL      eGFR >60.0 mL/min/1.73m*2      Comment: Calculation based on the Chronic Kidney Disease Epidemiology Collaboration (CKD-EPI) equation refit without adjustment for race.        Anion Gap 6 mEQ/L     CBC and differential [[id number]]  (Abnormal) Collected: 02/21/25 1635    Specimen: Blood, Venous Updated: 02/21/25 1700     WBC 5.82 K/uL      RBC 4.45 M/uL      Hemoglobin 13.8 g/dL      Hematocrit 39.4 %      MCV 88.5 fL      MCH 31.0 pg      MCHC 35.0 g/dL      RDW 12.4 %      Platelets 313 K/uL      MPV 8.8 fL      Differential Type Auto     nRBC 0.0 %      Immature Granulocytes 0.2 %      Neutrophils 60.3 %      Lymphocytes 31.4 %      Monocytes 5.0 %      Eosinophils 2.2 %      Basophils 0.9 %      Immature Granulocytes, Absolute 0.01 K/uL      Neutrophils, Absolute 3.51 K/uL      Lymphocytes, Absolute 1.83 K/uL      Monocytes, Absolute 0.29 K/uL      Eosinophils, Absolute 0.13 K/uL      Basophils, Absolute  0.05 K/uL     [facility] Draw Panel [[id number]] Collected: 02/21/25 1635    Specimen: Blood, Venous Updated: 02/21/25 1655    Narrative:      The following orders were created for panel order [facility] Draw Panel.  Procedure                               Abnormality         Status                     ---------                               -----------         ------                     DARIA LT BLUE[[id number]]                                  In process                   Please view results for these tests on the individual orders.    DARIA SEAMAN BLUE [[id number]] Collected: 02/21/25 1635    Specimen: Blood, Venous Updated: 02/21/25 1655            Imaging Results              US venous arm, AGUILA extremity (Final result)  Result time 02/21/25 19:07:44      Final result                   Impression:    IMPRESSION:   No sonographic evidence for thrombus of the left central upper  extremity veins.               Narrative:    CLINICAL HISTORY:    Left arm bruising and discoloration    COMMENT:  The deep venous system of the left upper extremity was ultrasonically  evaluated.    This Included duplex evaluation of the internal jugular vein, the subclavian,  the axillary and the brachial vein.  Also imaged were the cephalic and basilic  veins of the upper arm.    There was no visible thrombus within the vessel lumen or evidence of venous  distention to suggest acute DVT.  Where accessible, the venous walls coapted  with pressure excluding acute DVT.                                      No orders to display       Scoring tools                                  ED Course & MDM   MDM / ED COURSE / CLINICAL IMPRESSION / DISPO     Medical Decision Making  Problems Addressed:  Contusion of left upper extremity, initial encounter: acute illness or injury    Amount and/or Complexity of Data Reviewed  Labs: ordered. Decision-making details documented in ED Course.        ED Course as of 02/21/25 1954 Fri Feb 21, 2025 1945 LUE venous US  IMPRESSION:   No sonographic evidence for thrombus of the left central upper extremity veins. [KM]   1953 Hemoglobin: 13.8 [KM]   1953 Platelets: 313 [KM]   1953 Patient presenting with ecchymosis to left arm after donating plasma.  DVT study negative labs unremarkable.  Ace wrap applied patient advised follow-up with PCP as needed.  She understands return to emergency department any worsening symptoms [KM]      ED Course User Index  [KM] Angela Beltrán PA C     Clinical Impression      Contusion of left upper extremity, initial encounter - post plasma donation      _________________       ED Disposition   Discharge                       Angela Beltrán PA C  02/21/25 1954

## 2025-02-22 NOTE — ED ATTESTATION NOTE
The patient was evaluated and managed by the physician assistant / nurse practitioner.     Jessica Abdi,   02/21/25 [id number]